# Patient Record
Sex: FEMALE | Race: WHITE | URBAN - METROPOLITAN AREA
[De-identification: names, ages, dates, MRNs, and addresses within clinical notes are randomized per-mention and may not be internally consistent; named-entity substitution may affect disease eponyms.]

---

## 2017-07-03 ENCOUNTER — HOSPITAL ENCOUNTER (EMERGENCY)
Age: 27
Discharge: HOME | End: 2017-07-03
Attending: OBSTETRICS & GYNECOLOGY | Admitting: OBSTETRICS & GYNECOLOGY
Payer: COMMERCIAL

## 2017-07-03 VITALS
TEMPERATURE: 98.3 F | RESPIRATION RATE: 22 BRPM | DIASTOLIC BLOOD PRESSURE: 84 MMHG | SYSTOLIC BLOOD PRESSURE: 122 MMHG | HEART RATE: 80 BPM

## 2017-07-03 PROBLEM — R11.0 NAUSEA: Status: ACTIVE | Noted: 2017-07-03

## 2017-07-03 LAB
ALBUMIN SERPL BCP-MCNC: 2.7 G/DL (ref 3.5–5)
ALBUMIN/GLOB SERPL: 0.6 {RATIO} (ref 1.2–3.5)
ALP SERPL-CCNC: 116 U/L (ref 50–136)
ALT SERPL-CCNC: 22 U/L (ref 12–65)
ANION GAP BLD CALC-SCNC: 12 MMOL/L (ref 7–16)
AST SERPL W P-5'-P-CCNC: 20 U/L (ref 15–37)
BASOPHILS # BLD AUTO: 0 K/UL (ref 0–0.2)
BASOPHILS # BLD: 0 % (ref 0–2)
BILIRUB SERPL-MCNC: 0.2 MG/DL (ref 0.2–1.1)
BUN SERPL-MCNC: 8 MG/DL (ref 6–23)
CALCIUM SERPL-MCNC: 9.2 MG/DL (ref 8.3–10.4)
CHLORIDE SERPL-SCNC: 103 MMOL/L (ref 98–107)
CO2 SERPL-SCNC: 23 MMOL/L (ref 21–32)
CREAT SERPL-MCNC: 0.77 MG/DL (ref 0.6–1)
DIFFERENTIAL METHOD BLD: ABNORMAL
EOSINOPHIL # BLD: 0.2 K/UL (ref 0–0.8)
EOSINOPHIL NFR BLD: 2 % (ref 0.5–7.8)
ERYTHROCYTE [DISTWIDTH] IN BLOOD BY AUTOMATED COUNT: 13.1 % (ref 11.9–14.6)
GLOBULIN SER CALC-MCNC: 4.4 G/DL (ref 2.3–3.5)
GLUCOSE SERPL-MCNC: 92 MG/DL (ref 65–100)
HCT VFR BLD AUTO: 39.8 % (ref 35.8–46.3)
HGB BLD-MCNC: 13.2 G/DL (ref 11.7–15.4)
IMM GRANULOCYTES # BLD: 0.1 K/UL (ref 0–0.5)
IMM GRANULOCYTES NFR BLD AUTO: 0.6 % (ref 0–5)
LYMPHOCYTES # BLD AUTO: 14 % (ref 13–44)
LYMPHOCYTES # BLD: 1.6 K/UL (ref 0.5–4.6)
MCH RBC QN AUTO: 32.4 PG (ref 26.1–32.9)
MCHC RBC AUTO-ENTMCNC: 33.2 G/DL (ref 31.4–35)
MCV RBC AUTO: 97.8 FL (ref 79.6–97.8)
MONOCYTES # BLD: 0.6 K/UL (ref 0.1–1.3)
MONOCYTES NFR BLD AUTO: 5 % (ref 4–12)
NEUTS SEG # BLD: 8.6 K/UL (ref 1.7–8.2)
NEUTS SEG NFR BLD AUTO: 78 % (ref 43–78)
PLATELET # BLD AUTO: 185 K/UL (ref 150–450)
PMV BLD AUTO: 11.4 FL (ref 10.8–14.1)
POTASSIUM SERPL-SCNC: 3.9 MMOL/L (ref 3.5–5.1)
PROT SERPL-MCNC: 7.1 G/DL (ref 6.3–8.2)
RBC # BLD AUTO: 4.07 M/UL (ref 4.05–5.25)
SODIUM SERPL-SCNC: 138 MMOL/L (ref 136–145)
WBC # BLD AUTO: 11.1 K/UL (ref 4.3–11.1)

## 2017-07-03 PROCEDURE — 36415 COLL VENOUS BLD VENIPUNCTURE: CPT | Performed by: OBSTETRICS & GYNECOLOGY

## 2017-07-03 PROCEDURE — 96372 THER/PROPH/DIAG INJ SC/IM: CPT

## 2017-07-03 PROCEDURE — 96360 HYDRATION IV INFUSION INIT: CPT

## 2017-07-03 PROCEDURE — 59025 FETAL NON-STRESS TEST: CPT

## 2017-07-03 PROCEDURE — 80053 COMPREHEN METABOLIC PANEL: CPT | Performed by: OBSTETRICS & GYNECOLOGY

## 2017-07-03 PROCEDURE — 74011250637 HC RX REV CODE- 250/637: Performed by: OBSTETRICS & GYNECOLOGY

## 2017-07-03 PROCEDURE — 99285 EMERGENCY DEPT VISIT HI MDM: CPT

## 2017-07-03 PROCEDURE — 85025 COMPLETE CBC W/AUTO DIFF WBC: CPT | Performed by: OBSTETRICS & GYNECOLOGY

## 2017-07-03 PROCEDURE — 74011250636 HC RX REV CODE- 250/636: Performed by: OBSTETRICS & GYNECOLOGY

## 2017-07-03 RX ORDER — ONDANSETRON 4 MG/1
4 TABLET, ORALLY DISINTEGRATING ORAL ONCE
Status: COMPLETED | OUTPATIENT
Start: 2017-07-03 | End: 2017-07-03

## 2017-07-03 RX ORDER — TERBUTALINE SULFATE 1 MG/ML
0.25 INJECTION SUBCUTANEOUS
Status: COMPLETED | OUTPATIENT
Start: 2017-07-03 | End: 2017-07-03

## 2017-07-03 RX ORDER — SODIUM CHLORIDE, SODIUM LACTATE, POTASSIUM CHLORIDE, CALCIUM CHLORIDE 600; 310; 30; 20 MG/100ML; MG/100ML; MG/100ML; MG/100ML
1000 INJECTION, SOLUTION INTRAVENOUS
Status: COMPLETED | OUTPATIENT
Start: 2017-07-03 | End: 2017-07-03

## 2017-07-03 RX ORDER — CALCIUM CARBONATE 200(500)MG
1 TABLET,CHEWABLE ORAL AS NEEDED
COMMUNITY
End: 2017-09-13

## 2017-07-03 RX ADMIN — TERBUTALINE SULFATE 0.25 MG: 1 INJECTION SUBCUTANEOUS at 14:04

## 2017-07-03 RX ADMIN — SODIUM CHLORIDE, SODIUM LACTATE, POTASSIUM CHLORIDE, AND CALCIUM CHLORIDE 1000 ML: 600; 310; 30; 20 INJECTION, SOLUTION INTRAVENOUS at 13:25

## 2017-07-03 RX ADMIN — ONDANSETRON 4 MG: 4 TABLET, ORALLY DISINTEGRATING ORAL at 12:48

## 2017-07-03 NOTE — PROGRESS NOTES
07/03/17 1332   Pain 1   Pain Scale 1 Numeric (0 - 10)   Pain Intensity 1 7   Patient Stated Pain Goal 5   Pain Reassessment 1 Yes   Pain Onset 1 x 30 min   Pain Location 1 Abdomen;Back   Pain Orientation 1 Lower;Mid   Pain Description 1 Aching;Cramping; Intermittent   Pain Intervention(s) 1 Medication (see MAR)   Pain increasing.

## 2017-07-03 NOTE — PROGRESS NOTES
Lab called to draw blood for CBC and CMP. States they are on their way.   Pt nauseated with [address] [facility].

## 2017-07-03 NOTE — ED PROVIDER NOTES
Chief Complaint:  Nausea, generally not feeling well, pelvic craming    32 y.o. female G1 at 35w3d  weeks gestation who is seen for nausea and generally not feeling well. Seen earlier today in MD's office with nausea and one episode of vomiting. bp noted to be slightly elevated. To DAVID for evaluation. no headache or swelling. Good fetal movement. HISTORY:    History   Sexual Activity    Sexual activity: Yes    Partners: Male    Birth control/ protection: None     Patient's last menstrual period was 10/04/2016. Social History     Social History    Marital status: SINGLE     Spouse name: N/A    Number of children: N/A    Years of education: N/A     Occupational History    Not on file. Social History Main Topics    Smoking status: Former Smoker    Smokeless tobacco: Never Used      Comment: Pt trying to quit    Alcohol use No    Drug use: No    Sexual activity: Yes     Partners: Male     Birth control/ protection: None     Other Topics Concern    Not on file     Social History Narrative       Past Surgical History:   Procedure Laterality Date    HX DILATION AND CURETTAGE  2011    Growth removed from uterus    HX WISDOM TEETH EXTRACTION         Past Medical History:   Diagnosis Date    Abnormal Papanicolaou smear of cervix     years ago/WNL since    HPV in female          ROS:  A 12 point review of symptoms negative except for chief complaint as described above. PHYSICAL EXAM:  Blood pressure 125/79, pulse 74, temperature 98.3 °F (36.8 °C), resp. rate 20, last menstrual period 10/04/2016. Constitutional: The patient appears well, alert, oriented x 3. Cardiovascular: Heart RRR, no murmurs.    Respiratory: Lungs clear, no respiratory distress  GI: Abdomen soft, nontender, no guarding  No fundal tenderness  Musculoskeletal: no cva tenderness  Upper ext: no edema, reflexes +2  Lower ext: no edema, neg paola's, reflexes +2  Skin: no rashes or lesions; good turger  Psychiatric:Mood/ Affect: appropriate  Genitourinary: SVE:ft/th/high  FHT:reactive  TOCO:contractions q2-3- resolved with iv hydration and terb  Recent Results (from the past 12 hour(s))   CBC WITH AUTOMATED DIFF    Collection Time: 17  1:10 PM   Result Value Ref Range    WBC 11.1 4.3 - 11.1 K/uL    RBC 4.07 4.05 - 5.25 M/uL    HGB 13.2 11.7 - 15.4 g/dL    HCT 39.8 35.8 - 46.3 %    MCV 97.8 79.6 - 97.8 FL    MCH 32.4 26.1 - 32.9 PG    MCHC 33.2 31.4 - 35.0 g/dL    RDW 13.1 11.9 - 14.6 %    PLATELET 888 614 - 381 K/uL    MPV 11.4 10.8 - 14.1 FL    DF AUTOMATED      NEUTROPHILS 78 43 - 78 %    LYMPHOCYTES 14 13 - 44 %    MONOCYTES 5 4.0 - 12.0 %    EOSINOPHILS 2 0.5 - 7.8 %    BASOPHILS 0 0.0 - 2.0 %    IMMATURE GRANULOCYTES 0.6 0.0 - 5.0 %    ABS. NEUTROPHILS 8.6 (H) 1.7 - 8.2 K/UL    ABS. LYMPHOCYTES 1.6 0.5 - 4.6 K/UL    ABS. MONOCYTES 0.6 0.1 - 1.3 K/UL    ABS. EOSINOPHILS 0.2 0.0 - 0.8 K/UL    ABS. BASOPHILS 0.0 0.0 - 0.2 K/UL    ABS. IMM. GRANS. 0.1 0.0 - 0.5 K/UL   METABOLIC PANEL, COMPREHENSIVE    Collection Time: 17  1:10 PM   Result Value Ref Range    Sodium 138 136 - 145 mmol/L    Potassium 3.9 3.5 - 5.1 mmol/L    Chloride 103 98 - 107 mmol/L    CO2 23 21 - 32 mmol/L    Anion gap 12 7 - 16 mmol/L    Glucose 92 65 - 100 mg/dL    BUN 8 6 - 23 MG/DL    Creatinine 0.77 0.6 - 1.0 MG/DL    GFR est AA >60 >60 ml/min/1.73m2    GFR est non-AA >60 >60 ml/min/1.73m2    Calcium 9.2 8.3 - 10.4 MG/DL    Bilirubin, total 0.2 0.2 - 1.1 MG/DL    ALT (SGPT) 22 12 - 65 U/L    AST (SGOT) 20 15 - 37 U/L    Alk.  phosphatase 116 50 - 136 U/L    Protein, total 7.1 6.3 - 8.2 g/dL    Albumin 2.7 (L) 3.5 - 5.0 g/dL    Globulin 4.4 (H) 2.3 - 3.5 g/dL    A-G Ratio 0.6 (L) 1.2 - 3.5       I personally reviewed pt's medical record including relevant labs     Assessment/Plan:  31 yo  at 35w3d with nausea and contractions without cervical change  Contractions resolved with one dose terb and iv fluids  Labs wnl  bp improved with rest  Nausea resolved with zofran- tolerating po  Reactive nst

## 2017-07-03 NOTE — PROGRESS NOTES
07/03/17 1252   Uterine Activity   Mode External   Frequency (min) x2 with irritability   Duration (sec)    Intensity Mild   Uterine Resting Tone Relaxed   States she feels like she is having period cramps and the nausea intensifies when she feels the cramp- some are worse than others. Zofran po given at 1245.

## 2017-07-03 NOTE — PROGRESS NOTES
UC's palpated. Some are and some are moderate. Pt now breathing with UC and was not on admission to triage.

## 2017-07-03 NOTE — PROGRESS NOTES
07/03/17 1215   Pain 1   Pain Scale 1 Numeric (0 - 10)   Pain Intensity 1 5   Patient Stated Pain Goal 5   Pain Onset 1 last PM   Pain Location 1 Abdomen   Pain Orientation 1 Lower;Mid   Pain Description 1 Cramping   Pain Intervention(s) 1 Declines

## 2017-07-03 NOTE — PROGRESS NOTES
Admit to room 464 for triage. Dr Tatyana Jang called about 1 hour ago. Stated that she is sending her over for triage per Dr Gloria Rudolph. Was seen in the office today with N/V, doesn't feel good, BP elevated at 142/98, having UC on monitor. SVE per Dr Tatyana Jang was FT/30/-1 to -2. EFM applied. States she last ate at 2200 last PM and vomited after.

## 2017-07-03 NOTE — PROGRESS NOTES
Post toco adjustment, UC now visible. Dr Renny Suarez viewed strip from nurses station. Order received for IVFB of LR 1 liter.

## 2017-07-03 NOTE — PROGRESS NOTES
07/03/17 1445   Cervical Exam   Dilation (cm) 0  (FT)   Eff (thick)   Station Ballotable   Vaginal exam done by? Dr Kim Titus pain is much better. Tolerated exam well. Discharge orders received. Will observe x 10 more min and D/C home. Pt instructed per Dr Ortiz Else that if UC return at more than 2/3 in one hour and are painful like they were here to come back to hospital.  States understanding.

## 2017-07-03 NOTE — PROGRESS NOTES
07/03/17 1325   Peripheral IV 07/03/17 Left Forearm   Placement Date/Time: 07/03/17 1325   Number of Attempts: 1  Inserted By: MALINA RN  Size: 18 G  Orientation: Left  Location: Forearm   Site Assessment Clean, dry, & intact   Phlebitis Assessment 0   Infiltration Assessment 0   Dressing Status Clean, dry, & intact   Dressing Type Tape;Transparent   Hub Color/Line Status Green; Infusing   Tolerated procedure well. Lab has already drawn her blood. LR IVFB in progress.   States these UC feel worse than when they started last PM.

## 2017-07-03 NOTE — DISCHARGE INSTRUCTIONS
Keep next scheduled appointment    Routine  labor precautions     Labor: Care Instructions  Your Care Instructions   labor is the start of labor between 21 and 36 weeks of pregnancy. A full-term pregnancy lasts 37 to 42 weeks. In labor, the uterus contracts to open the cervix. This is the first stage of childbirth.  labor can be caused by a problem with the baby, the mother, or both. Often the cause is not known. In some cases, doctors use medicines to try to delay labor until 29 or more weeks of pregnancy. By this time, a baby has grown enough so that problems are not likely. In some cases--such as with a serious infection--it is healthier for the baby to be born early. Your treatment will depend on how far along you are in your pregnancy and on your health and your baby's health. Follow-up care is a key part of your treatment and safety. Be sure to make and go to all appointments, and call your doctor if you are having problems. It's also a good idea to know your test results and keep a list of the medicines you take. How can you care for yourself at home? · If your doctor prescribed medicines, take them exactly as directed. Call your doctor if you think you are having a problem with your medicine. · Rest until your doctor advises you about activity. He or she will tell you if you should stay in bed most of the time. You may need to arrange for  if you have young children. · Do not have sexual intercourse unless your doctor says it is safe. · Use pads, not tampons, if you have vaginal bleeding. · Make sure to drink plenty of fluids. Dehydration can lead to contractions. If you have kidney, heart, or liver disease and have to limit fluids, talk with your doctor before you increase the amount of fluids you drink. · Do not smoke or allow others to smoke around you. If you need help quitting, talk to your doctor about stop-smoking programs and medicines.  These can increase your chances of quitting for good. When should you call for help? Call 911 anytime you think you may need emergency care. For example, call if:  · You passed out (lost consciousness). · You have severe vaginal bleeding. · You have severe pain in your belly or pelvis. · You have had fluid gushing or leaking from your vagina and you know or think the umbilical cord is bulging into your vagina. If this happens, immediately get down on your knees so your rear end (buttocks) is higher than your head. This will decrease the pressure on the cord until help arrives. Call your doctor now or seek immediate medical care if:  · You have signs of preeclampsia, such as:  ¨ Sudden swelling of your face, hands, or feet. ¨ New vision problems (such as dimness or blurring). ¨ A severe headache. · You have any vaginal bleeding. · You have belly pain or cramping. · You have a fever. · You have had regular contractions (with or without pain) for an hour. This means that you have 6 or more within 1 hour after you change your position and drink fluids. · You have a sudden release of fluid from the vagina. · You have low back pain or pelvic pressure that does not go away.   · You notice that your baby has stopped moving or is moving much less than normal.  Watch closely for changes in your health, and be sure to contact your doctor if you have any problems

## 2017-07-03 NOTE — IP AVS SNAPSHOT
[facility] 
 
 
 [address] 
[phone] Patient: Omari nAgel MRN: [id number] BMA:[id number] You are allergic to the following Allergen Reactions Sulfa (Sulfonamide Antibiotics) Nausea and Vomiting Recent Documentation OB Status Smoking Status Pregnant Former Smoker Emergency Contacts Name Discharge Info Relation Home Work Mobile Yaron Bertrand  Sister [23] [phone] About your hospitalization You were admitted on:  N/A You last received care in the:  [facility] ANTEPARTUM You were discharged on:  July 3, 2017 Unit phone number:  [phone] Why you were hospitalized Your primary diagnosis was:  Not on File Your diagnoses also included:  Nausea Providers Seen During Your Hospitalizations Provider Role Specialty Primary office phone Jaimie Chance MD Attending Provider Obstetrics & Gynecology [phone] Your Primary Care Physician (PCP) Primary Care Physician Office Phone Office Fax NONE ** None ** ** None ** Follow-up Information None Your Appointments Wednesday July 12, 2017  9:30 AM EDT Return OB with [address] ([facility]) [address] [address] [phone] Current Discharge Medication List  
  
ASK your doctor about these medications Dose & Instructions Dispensing Information Comments Morning Noon Evening Bedtime  
 calcium carbonate 200 mg calcium (500 mg) Chew Commonly known as:  TUMS Your last dose was: Your next dose is:    
   
   
 Dose:  1 Tab Take 1 Tab by mouth as needed. Indications: HEARTBURN Refills:  0 CLARITIN 10 mg tablet Generic drug:  loratadine Your last dose was:     
   
Your next dose is:    
   
   
 Dose:  10 mg  
 Take 10 mg by mouth. Refills:  0 PNV#16-Iron Fum & PS-FA-OM-3 35-1-200 mg Cap Your last dose was: Your next dose is: Take  by mouth. Refills:  0  
     
   
   
   
  
 raNITIdine 150 mg tablet Commonly known as:  ZANTAC Your last dose was: Your next dose is:    
   
   
 Dose:  150 mg Take 1 Tab by mouth two (2) times a day. Quantity:  60 Tab Refills:  12 Discharge Instructions Keep next scheduled appointment Routine  labor precautions  Labor: Care Instructions Your Care Instructions  labor is the start of labor between 20 and 36 weeks of pregnancy. A full-term pregnancy lasts 37 to 42 weeks. In labor, the uterus contracts to open the cervix. This is the first stage of childbirth.  labor can be caused by a problem with the baby, the mother, or both. Often the cause is not known. In some cases, doctors use medicines to try to delay labor until 29 or more weeks of pregnancy. By this time, a baby has grown enough so that problems are not likely. In some casessuch as with a serious infectionit is healthier for the baby to be born early. Your treatment will depend on how far along you are in your pregnancy and on your health and your baby's health. Follow-up care is a key part of your treatment and safety. Be sure to make and go to all appointments, and call your doctor if you are having problems. It's also a good idea to know your test results and keep a list of the medicines you take. How can you care for yourself at home? · If your doctor prescribed medicines, take them exactly as directed. Call your doctor if you think you are having a problem with your medicine. · Rest until your doctor advises you about activity. He or she will tell you if you should stay in bed most of the time. You may need to arrange for  if you have young children. · Do not have sexual intercourse unless your doctor says it is safe. · Use pads, not tampons, if you have vaginal bleeding. · Make sure to drink plenty of fluids. Dehydration can lead to contractions. If you have kidney, heart, or liver disease and have to limit fluids, talk with your doctor before you increase the amount of fluids you drink. · Do not smoke or allow others to smoke around you. If you need help quitting, talk to your doctor about stop-smoking programs and medicines. These can increase your chances of quitting for good. When should you call for help? Call 911 anytime you think you may need emergency care. For example, call if: 
· You passed out (lost consciousness). · You have severe vaginal bleeding. · You have severe pain in your belly or pelvis. · You have had fluid gushing or leaking from your vagina and you know or think the umbilical cord is bulging into your vagina. If this happens, immediately get down on your knees so your rear end (buttocks) is higher than your head. This will decrease the pressure on the cord until help arrives. Call your doctor now or seek immediate medical care if: 
· You have signs of preeclampsia, such as: 
¨ Sudden swelling of your face, hands, or feet. ¨ New vision problems (such as dimness or blurring). ¨ A severe headache. · You have any vaginal bleeding. · You have belly pain or cramping. · You have a fever. · You have had regular contractions (with or without pain) for an hour. This means that you have 6 or more within 1 hour after you change your position and drink fluids. · You have a sudden release of fluid from the vagina. · You have low back pain or pelvic pressure that does not go away. · You notice that your baby has stopped moving or is moving much less than normal. 
Watch closely for changes in your health, and be sure to contact your doctor if you have any problems Discharge Instructions Attachments/References  LABOR (ENGLISH) Discharge Orders None Introducing [facility]! Dear Karmen Pendleotn: Thank you for requesting a [employer] account. Our records indicate that you already have an active [employer] account. You can access your account anytime at [email or web address] Did you know that you can access your hospital and ER discharge instructions at any time in [employer]? You can also review all of your test results from your hospital stay or ER visit. Additional Information If you have questions, please visit the Frequently Asked Questions section of the [employer] website at [email or web address] Remember, [employer] is NOT to be used for urgent needs. For medical emergencies, dial 911. Now available from your iPhone and Android! General Information Please provide this summary of care documentation to your next provider. Patient Signature:  ____________________________________________________________ Date:  ____________________________________________________________  
  
Miguel Ángel Fraser Provider Signature:  ____________________________________________________________ Date:  ____________________________________________________________ More Information  Labor: Care Instructions Your Care Instructions  labor is the start of labor between 20 and 36 weeks of pregnancy. A full-term pregnancy lasts 37 to 42 weeks. In labor, the uterus contracts to open the cervix. This is the first stage of childbirth.  labor can be caused by a problem with the baby, the mother, or both. Often the cause is not known. In some cases, doctors use medicines to try to delay labor until 29 or more weeks of pregnancy. By this time, a baby has grown enough so that problems are not likely. In some casessuch as with a serious infectionit is healthier for the baby to be born early.  Your treatment will depend on how far along you are in your pregnancy and on your health and your baby's health. Follow-up care is a key part of your treatment and safety. Be sure to make and go to all appointments, and call your doctor if you are having problems. It's also a good idea to know your test results and keep a list of the medicines you take. How can you care for yourself at home? · If your doctor prescribed medicines, take them exactly as directed. Call your doctor if you think you are having a problem with your medicine. · Rest until your doctor advises you about activity. He or she will tell you if you should stay in bed most of the time. You may need to arrange for  if you have young children. · Do not have sexual intercourse unless your doctor says it is safe. · Use pads, not tampons, if you have vaginal bleeding. · Make sure to drink plenty of fluids. Dehydration can lead to contractions. If you have kidney, heart, or liver disease and have to limit fluids, talk with your doctor before you increase the amount of fluids you drink. · Do not smoke or allow others to smoke around you. If you need help quitting, talk to your doctor about stop-smoking programs and medicines. These can increase your chances of quitting for good. When should you call for help? Call 911 anytime you think you may need emergency care. For example, call if: 
· You passed out (lost consciousness). · You have severe vaginal bleeding. · You have severe pain in your belly or pelvis. · You have had fluid gushing or leaking from your vagina and you know or think the umbilical cord is bulging into your vagina. If this happens, immediately get down on your knees so your rear end (buttocks) is higher than your head. This will decrease the pressure on the cord until help arrives. Call your doctor now or seek immediate medical care if: 
· You have signs of preeclampsia, such as: 
¨ Sudden swelling of your face, hands, or feet. ¨ New vision problems (such as dimness or blurring). ¨ A severe headache. · You have any vaginal bleeding. · You have belly pain or cramping. · You have a fever. · You have had regular contractions (with or without pain) for an hour. This means that you have 6 or more within 1 hour after you change your position and drink fluids. · You have a sudden release of fluid from the vagina. · You have low back pain or pelvic pressure that does not go away. · You notice that your baby has stopped moving or is moving much less than normal. 
Watch closely for changes in your health, and be sure to contact your doctor if you have any problems. Where can you learn more? Go to http://danielle-lucia.info/. Enter Q400 in the search box to learn more about \" Labor: Care Instructions. \" Current as of: 2017 Content Version: 11.3 © 1258-7596 [employer]. Care instructions adapted under license by [employer] (which disclaims liability or warranty for this information). If you have questions about a medical condition or this instruction, always ask your healthcare professional. [place] [address] any warranty or liability for your use of this information.

## 2017-07-03 NOTE — IP AVS SNAPSHOT
Current Discharge Medication List  
  
ASK your doctor about these medications Dose & Instructions Dispensing Information Comments Morning Noon Evening Bedtime  
 calcium carbonate 200 mg calcium (500 mg) Chew Commonly known as:  TUMS Your last dose was: Your next dose is:    
   
   
 Dose:  1 Tab Take 1 Tab by mouth as needed. Indications: HEARTBURN Refills:  0 CLARITIN 10 mg tablet Generic drug:  loratadine Your last dose was: Your next dose is:    
   
   
 Dose:  10 mg Take 10 mg by mouth. Refills:  0 PNV#16-Iron Fum & PS-FA-OM-3 35-1-200 mg Cap Your last dose was: Your next dose is: Take  by mouth. Refills:  0  
     
   
   
   
  
 raNITIdine 150 mg tablet Commonly known as:  ZANTAC Your last dose was: Your next dose is:    
   
   
 Dose:  150 mg Take 1 Tab by mouth two (2) times a day. Quantity:  60 Tab Refills:  12

## 2017-07-03 NOTE — IP AVS SNAPSHOT
Summary of Care Report The Summary of Care report has been created to help improve care coordination. Users with access to [employer] or [employer] [address] (Web-based application) may access additional patient information including the Discharge Summary. If you are not currently a [address] user and need more information, please call the number listed below in the [place] [address] section and ask to be connected with Medical Records. Facility Information Name Address Phone [address] [place] [address] [address] [place] [id number] [phone] Patient Information Patient Name Sex NENITA Stover ([id number]) Female 1990 Discharge Information Admitting Provider Service Area Unit Ramiro Gilmore MD / [address] [facility] 4 Antepartum / [phone] Discharge Provider Discharge Date/Time Discharge Disposition Destination (none) (none) (none) (none) Patient Language Language ENGLISH [13] Hospital Problems as of 7/3/2017  Reviewed: 2017 11:21 AM by Andrea Chavez MD  
  
  
  
 Class Noted - Resolved Last Modified POA Active Problems Nausea  7/3/2017 - Present 7/3/2017 by Ramiro Gilmore MD Unknown Entered by Ramiro Gilmore MD  
  
Non-Hospital Problems as of 7/3/2017  Reviewed: 2017 11:21 AM by Andrea Chavez MD  
  
  
  
 Class Noted - Resolved Last Modified Active Problems High risk pregnancy due to smoking  2016 - Present 2016 by Andrea Chavez MD  
  Entered by Andrea Chavez MD  
  Overview Addendum 2016  4:25 PM by Andrea Chavez MD  
   1)prenatal labs nl 
2)pt has decreased her # of cigs since +hpt You are allergic to the following Allergen Reactions Sulfa (Sulfonamide Antibiotics) Nausea and Vomiting Current Discharge Medication List  
  
ASK your doctor about these medications Dose & Instructions Dispensing Information Comments  
 calcium carbonate 200 mg calcium (500 mg) Chew Commonly known as:  TUMS Dose:  1 Tab Take 1 Tab by mouth as needed. Indications: HEARTBURN Refills:  0 CLARITIN 10 mg tablet Generic drug:  loratadine Dose:  10 mg Take 10 mg by mouth. Refills:  0 PNV#16-Iron Fum & PS-FA-OM-3 35-1-200 mg Cap Take  by mouth. Refills:  0  
   
 raNITIdine 150 mg tablet Commonly known as:  ZANTAC Dose:  150 mg Take 1 Tab by mouth two (2) times a day. Quantity:  60 Tab Refills:  12 Follow-up Information None Discharge Instructions Keep next scheduled appointment Routine  labor precautions  Labor: Care Instructions Your Care Instructions  labor is the start of labor between 20 and 36 weeks of pregnancy. A full-term pregnancy lasts 37 to 42 weeks. In labor, the uterus contracts to open the cervix. This is the first stage of childbirth.  labor can be caused by a problem with the baby, the mother, or both. Often the cause is not known. In some cases, doctors use medicines to try to delay labor until 29 or more weeks of pregnancy. By this time, a baby has grown enough so that problems are not likely. In some casessuch as with a serious infectionit is healthier for the baby to be born early. Your treatment will depend on how far along you are in your pregnancy and on your health and your baby's health. Follow-up care is a key part of your treatment and safety. Be sure to make and go to all appointments, and call your doctor if you are having problems. It's also a good idea to know your test results and keep a list of the medicines you take. How can you care for yourself at home? · If your doctor prescribed medicines, take them exactly as directed.  Call your doctor if you think you are having a problem with your medicine. · Rest until your doctor advises you about activity. He or she will tell you if you should stay in bed most of the time. You may need to arrange for  if you have young children. · Do not have sexual intercourse unless your doctor says it is safe. · Use pads, not tampons, if you have vaginal bleeding. · Make sure to drink plenty of fluids. Dehydration can lead to contractions. If you have kidney, heart, or liver disease and have to limit fluids, talk with your doctor before you increase the amount of fluids you drink. · Do not smoke or allow others to smoke around you. If you need help quitting, talk to your doctor about stop-smoking programs and medicines. These can increase your chances of quitting for good. When should you call for help? Call 911 anytime you think you may need emergency care. For example, call if: 
· You passed out (lost consciousness). · You have severe vaginal bleeding. · You have severe pain in your belly or pelvis. · You have had fluid gushing or leaking from your vagina and you know or think the umbilical cord is bulging into your vagina. If this happens, immediately get down on your knees so your rear end (buttocks) is higher than your head. This will decrease the pressure on the cord until help arrives. Call your doctor now or seek immediate medical care if: 
· You have signs of preeclampsia, such as: 
¨ Sudden swelling of your face, hands, or feet. ¨ New vision problems (such as dimness or blurring). ¨ A severe headache. · You have any vaginal bleeding. · You have belly pain or cramping. · You have a fever. · You have had regular contractions (with or without pain) for an hour. This means that you have 6 or more within 1 hour after you change your position and drink fluids. · You have a sudden release of fluid from the vagina. · You have low back pain or pelvic pressure that does not go away. · You notice that your baby has stopped moving or is moving much less than normal. 
Watch closely for changes in your health, and be sure to contact your doctor if you have any problems Chart Review Routing History No Routing History on File

## 2017-07-04 NOTE — PROGRESS NOTES
Discharge instructions given for  labor. States understanding. Discharge to personal auto via ambulation with  at side.

## 2017-07-28 ENCOUNTER — HOSPITAL ENCOUNTER (EMERGENCY)
Age: 27
Discharge: HOME | End: 2017-07-28
Attending: OBSTETRICS & GYNECOLOGY | Admitting: OBSTETRICS & GYNECOLOGY
Payer: COMMERCIAL

## 2017-07-28 VITALS
DIASTOLIC BLOOD PRESSURE: 82 MMHG | HEART RATE: 101 BPM | HEIGHT: 65 IN | SYSTOLIC BLOOD PRESSURE: 142 MMHG | BODY MASS INDEX: 29.99 KG/M2 | RESPIRATION RATE: 18 BRPM | TEMPERATURE: 98.2 F | WEIGHT: 180 LBS

## 2017-07-28 PROBLEM — Z37.9 NORMAL LABOR: Status: ACTIVE | Noted: 2017-07-28

## 2017-07-28 PROCEDURE — 59025 FETAL NON-STRESS TEST: CPT

## 2017-07-28 PROCEDURE — 99285 EMERGENCY DEPT VISIT HI MDM: CPT

## 2017-07-28 PROCEDURE — 74011250637 HC RX REV CODE- 250/637: Performed by: OBSTETRICS & GYNECOLOGY

## 2017-07-28 RX ORDER — OXYCODONE AND ACETAMINOPHEN 5; 325 MG/1; MG/1
1 TABLET ORAL ONCE
Status: COMPLETED | OUTPATIENT
Start: 2017-07-29 | End: 2017-07-28

## 2017-07-28 RX ADMIN — OXYCODONE HYDROCHLORIDE AND ACETAMINOPHEN 1 TABLET: 5; 325 TABLET ORAL at 23:45

## 2017-07-28 NOTE — IP AVS SNAPSHOT
Summary of Care Report The Summary of Care report has been created to help improve care coordination. Users with access to [employer] or [address] (Web-based application) may access additional patient information including the Discharge Summary. If you are not currently a [address] user and need more information, please call the number listed below in the [place] [address] section and ask to be connected with Medical Records. Facility Information Name Address Phone [address] [place] [address] [address] [place] [phone] Patient Information Patient Name Sex NENITA Morris ([id number]) Female 1990 Discharge Information Admitting Provider Service Area Unit Sanjay Locke MD / [address] [facility] 4 Antepartum / [phone] Discharge Provider Discharge Date/Time Discharge Disposition Destination (none) (none) (none) (none) Patient Language Language ENGLISH [13] Hospital Problems as of 2017  Reviewed: 2017 11:00 PM by Sanjay Locke MD  
  
  
  
 Class Noted - Resolved Last Modified POA Active Problems Normal labor  2017 - Present 2017 by Sanjay Locke MD Unknown Entered by Sanjay Locke MD  
  
Non-Hospital Problems as of 2017  Reviewed: 2017 11:00 PM by Sanjay Locke MD  
  
  
  
 Class Noted - Resolved Last Modified Active Problems High risk pregnancy due to smoking  2016 - Present 2016 by Abigail Crews MD  
  Entered by Abigail Crews MD  
  Overview Addendum 2016  4:25 PM by Abigail Crews MD  
   1)prenatal labs nl 
2)pt has decreased her # of cigs since +hpt Nausea  7/3/2017 - Present 7/3/2017 by Kalina Aguirre MD  
  Entered by Kalina Aguirre MD  
  
You are allergic to the following Allergen Reactions Sulfa (Sulfonamide Antibiotics) Nausea and Vomiting Current Discharge Medication List  
  
ASK your doctor about these medications Dose & Instructions Dispensing Information Comments  
 calcium carbonate 200 mg calcium (500 mg) Chew Commonly known as:  TUMS Dose:  1 Tab Take 1 Tab by mouth as needed. Indications: HEARTBURN Refills:  0 CLARITIN 10 mg tablet Generic drug:  loratadine Dose:  10 mg Take 10 mg by mouth. Refills:  0 PNV#16-Iron Fum & PS-FA-OM-3 35-1-200 mg Cap Take  by mouth. Refills:  0  
   
 raNITIdine 150 mg tablet Commonly known as:  ZANTAC Dose:  150 mg Take 1 Tab by mouth two (2) times a day. Quantity:  60 Tab Refills:  12 Follow-up Information None Discharge Instructions If you experience any of the following symptoms bright red vaginal bleeding, gush of fluid, decreased fetal movement, uterine contractions ten minutes or less, or any other concerns, please follow up with [address] [place] [address] Delivery or physicians office. Pregnancy Precautions: Care Instructions Your Care Instructions There is no sure way to prevent labor before your due date ( labor) or to prevent most other pregnancy problems. But there are things you can do to increase your chances of a healthy pregnancy. Go to your appointments, follow your doctor's advice, and take good care of yourself. Eat well, and exercise (if your doctor agrees). And make sure to drink plenty of water. Follow-up care is a key part of your treatment and safety. Be sure to make and go to all appointments, and call your doctor if you are having problems. It's also a good idea to know your test results and keep a list of the medicines you take. How can you care for yourself at home? · Make sure you go to your prenatal appointments.  At each visit, your doctor will check your blood pressure. Your doctor will also check to see if you have protein in your urine. High blood pressure and protein in urine are signs of preeclampsia. This condition can be dangerous for you and your baby. · Drink plenty of fluids, enough so that your urine is light yellow or clear like water. Dehydration can cause contractions. If you have kidney, heart, or liver disease and have to limit fluids, talk with your doctor before you increase the amount of fluids you drink. · Tell your doctor right away if you notice any symptoms of an infection, such as: ¨ Burning when you urinate. ¨ A foul-smelling discharge from your vagina. ¨ Vaginal itching. ¨ Unexplained fever. ¨ Unusual pain or soreness in your uterus or lower belly. · Eat a balanced diet. Include plenty of foods that are high in calcium and iron. ¨ Foods high in calcium include milk, cheese, yogurt, almonds, and broccoli. ¨ Foods high in iron include red meat, shellfish, poultry, eggs, beans, raisins, whole-grain bread, and leafy green vegetables. · Do not smoke. If you need help quitting, talk to your doctor about stop-smoking programs and medicines. These can increase your chances of quitting for good. · Do not drink alcohol or use illegal drugs. · Follow your doctor's directions about activity. Your doctor will let you know how much, if any, exercise you can do. · Ask your doctor if you can have sex. If you are at risk for early labor, your doctor may ask you to not have sex. · Take care to prevent falls. During pregnancy, your joints are loose, and your balance is off. Sports such as bicycling, skiing, or in-line skating can increase your risk of falling. And don't ride horses or motorcycles, dive, water ski, scuba dive, or parachute jump while you are pregnant. · Avoid getting very hot. Do not use saunas or hot tubs. Avoid staying out in the sun in hot weather for long periods.  Take acetaminophen (Tylenol) to lower a high fever. · Do not take any over-the-counter or herbal medicines or supplements without talking to your doctor or pharmacist first. 
When should you call for help? Call 911 anytime you think you may need emergency care. For example, call if: 
· You passed out (lost consciousness). · You have severe vaginal bleeding. · You have severe pain in your belly or pelvis. · You have had fluid gushing or leaking from your vagina and you know or think the umbilical cord is bulging into your vagina. If this happens, immediately get down on your knees so your rear end (buttocks) is higher than your head. This will decrease the pressure on the cord until help arrives. Call your doctor now or seek immediate medical care if: 
· You have signs of preeclampsia, such as: 
¨ Sudden swelling of your face, hands, or feet. ¨ New vision problems (such as dimness or blurring). ¨ A severe headache. · You have any vaginal bleeding. · You have belly pain or cramping. · You have a fever. · You have had regular contractions (with or without pain) for an hour. This means that you have 8 or more within 1 hour or 4 or more in 20 minutes after you change your position and drink fluids. · You have a sudden release of fluid from your vagina. · You have low back pain or pelvic pressure that does not go away. · You notice that your baby has stopped moving or is moving much less than normal. 
Watch closely for changes in your health, and be sure to contact your doctor if you have any problems. Where can you learn more? Go to http://danielle-lucia.info/. Enter [id number] in the search box to learn more about \"Pregnancy Precautions: Care Instructions. \" Current as of: March 16, 2017 Content Version: 11.3 © 7444-3734 [employer].  Care instructions adapted under license by [employer] (which disclaims liability or warranty for this information). If you have questions about a medical condition or this instruction, always ask your healthcare professional. [place] [address] any warranty or liability for your use of this information. Week 39 of Your Pregnancy: Care Instructions Your Care Instructions During these final weeks, you may feel anxious to see your new baby. [place] babies often look different from what you see in pictures or movies. Right after birth, their heads may have a strange shape. Their eyes may be puffy. And their genitals may be swollen. They may also have very dry skin, or red marks on the eyelids, nose, or neck. Still, most parents think their babies are beautiful. Follow-up care is a key part of your treatment and safety. Be sure to make and go to all appointments, and call your doctor if you are having problems. It's also a good idea to know your test results and keep a list of the medicines you take. How can you care for yourself at home? Prepare to breastfeed · If you are breastfeeding, continue to eat healthy foods. · Avoid alcohol, cigarettes, and drugs. This includes prescription and over-the-counter medicines. · You can help prevent sore nipples if you feed your baby in the correct position. Nurses will help you learn to do this. · Your  will need to be fed about every 1½ to 3 hours. Choose the right birth control after your baby is born · Women who are breastfeeding can still get pregnant. Use birth control if you don't want to get pregnant. · Intrauterine devices (IUDs) work for women who want to wait at least 2 years before getting pregnant again. They are safe to use while you are breastfeeding. · Depo-Provera can be used while you are breastfeeding. It is a shot you get every 3 months. · Birth control pills work well. But you need a different kind of pill while you are breastfeeding.  And when you start taking these pills, you need to make sure to use another type of birth control until you start your second pack. · Diaphragms, cervical caps, tubal implants, and condoms with spermicide work less well after birth. If you have a diaphragm or cervical cap, you will need to have it refitted. · Tubal ligation (tying your tubes) and vasectomy are both permanent. These are good options if you are sure you are done having children. Where can you learn more? Go to http://dainelle-lucia.info/. Enter E274 in the search box to learn more about \"Week 39 of Your Pregnancy: Care Instructions. \" Current as of: March 16, 2017 Content Version: 11.3 © 0596-2108 [employer]. Care instructions adapted under license by [employer] (which disclaims liability or warranty for this information). If you have questions about a medical condition or this instruction, always ask your healthcare professional. [address] any warranty or liability for your use of this information. Chart Review Routing History No Routing History on File

## 2017-07-28 NOTE — IP AVS SNAPSHOT
[facility] [address] 
[phone] Patient: Christa Ling MRN: [id number] CXE:[id number] Current Discharge Medication List  
  
ASK your doctor about these medications Dose & Instructions Dispensing Information Comments Morning Noon Evening Bedtime  
 calcium carbonate 200 mg calcium (500 mg) Chew Commonly known as:  TUMS Your last dose was: Your next dose is:    
   
   
 Dose:  1 Tab Take 1 Tab by mouth as needed. Indications: HEARTBURN Refills:  0 CLARITIN 10 mg tablet Generic drug:  loratadine Your last dose was: Your next dose is:    
   
   
 Dose:  10 mg Take 10 mg by mouth. Refills:  0 PNV#16-Iron Fum & PS-FA-OM-3 35-1-200 mg Cap Your last dose was: Your next dose is: Take  by mouth. Refills:  0  
     
   
   
   
  
 raNITIdine 150 mg tablet Commonly known as:  ZANTAC Your last dose was: Your next dose is:    
   
   
 Dose:  150 mg Take 1 Tab by mouth two (2) times a day. Quantity:  60 Tab Refills:  12

## 2017-07-28 NOTE — IP AVS SNAPSHOT
[address] 
[phone] Patient: Kristopher Browne MRN: [id number] [facility]:4/94/6566 You are allergic to the following Allergen Reactions Sulfa (Sulfonamide Antibiotics) Nausea and Vomiting Recent Documentation Height Weight BMI OB Status Smoking Status 1.651 m 81.6 kg 29.95 kg/m2 Pregnant Former Smoker Emergency Contacts Name Discharge Info Relation Home Work Mobile Jacky Perez  Boyfriend [17] [phone] Mely Aldrich  Sister [23] [phone] About your hospitalization You were admitted on:  N/A You last received care in the:  [facility] 4 ANTEPARTUM You were discharged on:  July 28, 2017 Unit phone number:  [phone] Why you were hospitalized Your primary diagnosis was:  Not on File Your diagnoses also included:  Normal Labor Providers Seen During Your Hospitalizations Provider Role Specialty Primary office phone Felecia Rodriguez MD Attending Provider Obstetrics & Gynecology [phone] Your Primary Care Physician (PCP) Primary Care Physician Office Phone Office Fax NONE ** None ** ** None ** Follow-up Information None Your Appointments Friday August 04, 2017 11:00 AM EDT Return OB with Dominick Rod MD  
[facility] ([address] [address] [phone] Current Discharge Medication List  
  
ASK your doctor about these medications Dose & Instructions Dispensing Information Comments Morning Noon Evening Bedtime  
 calcium carbonate 200 mg calcium (500 mg) Chew Commonly known as:  TUMS Your last dose was: Your next dose is:    
   
   
 Dose:  1 Tab Take 1 Tab by mouth as needed. Indications: HEARTBURN Refills:  0 CLARITIN 10 mg tablet Generic drug:  loratadine Your last dose was: Your next dose is:    
   
   
 Dose:  10 mg Take 10 mg by mouth. Refills:  0 PNV#16-Iron Fum & PS-FA-OM-3 35-1-200 mg Cap Your last dose was: Your next dose is: Take  by mouth. Refills:  0  
     
   
   
   
  
 raNITIdine 150 mg tablet Commonly known as:  ZANTAC Your last dose was: Your next dose is:    
   
   
 Dose:  150 mg Take 1 Tab by mouth two (2) times a day. Quantity:  60 Tab Refills:  12 Discharge Instructions If you experience any of the following symptoms bright red vaginal bleeding, gush of fluid, decreased fetal movement, uterine contractions ten minutes or less, or any other concerns, please follow up with [address] [place] [address] Delivery or physicians office. Pregnancy Precautions: Care Instructions Your Care Instructions There is no sure way to prevent labor before your due date ( labor) or to prevent most other pregnancy problems. But there are things you can do to increase your chances of a healthy pregnancy. Go to your appointments, follow your doctor's advice, and take good care of yourself. Eat well, and exercise (if your doctor agrees). And make sure to drink plenty of water. Follow-up care is a key part of your treatment and safety. Be sure to make and go to all appointments, and call your doctor if you are having problems. It's also a good idea to know your test results and keep a list of the medicines you take. How can you care for yourself at home? · Make sure you go to your prenatal appointments. At each visit, your doctor will check your blood pressure. Your doctor will also check to see if you have protein in your urine. High blood pressure and protein in urine are signs of preeclampsia. This condition can be dangerous for you and your baby. · Drink plenty of fluids, enough so that your urine is light yellow or clear like water. Dehydration can cause contractions. If you have kidney, heart, or liver disease and have to limit fluids, talk with your doctor before you increase the amount of fluids you drink. · Tell your doctor right away if you notice any symptoms of an infection, such as: ¨ Burning when you urinate. ¨ A foul-smelling discharge from your vagina. ¨ Vaginal itching. ¨ Unexplained fever. ¨ Unusual pain or soreness in your uterus or lower belly. · Eat a balanced diet. Include plenty of foods that are high in calcium and iron. ¨ Foods high in calcium include milk, cheese, yogurt, almonds, and broccoli. ¨ Foods high in iron include red meat, shellfish, poultry, eggs, beans, raisins, whole-grain bread, and leafy green vegetables. · Do not smoke. If you need help quitting, talk to your doctor about stop-smoking programs and medicines. These can increase your chances of quitting for good. · Do not drink alcohol or use illegal drugs. · Follow your doctor's directions about activity. Your doctor will let you know how much, if any, exercise you can do. · Ask your doctor if you can have sex. If you are at risk for early labor, your doctor may ask you to not have sex. · Take care to prevent falls. During pregnancy, your joints are loose, and your balance is off. Sports such as bicycling, skiing, or in-line skating can increase your risk of falling. And don't ride horses or motorcycles, dive, water ski, scuba dive, or parachute jump while you are pregnant. · Avoid getting very hot. Do not use saunas or hot tubs. Avoid staying out in the sun in hot weather for long periods. Take acetaminophen (Tylenol) to lower a high fever. · Do not take any over-the-counter or herbal medicines or supplements without talking to your doctor or pharmacist first. 
When should you call for help? Call 911 anytime you think you may need emergency care. For example, call if: 
· You passed out (lost consciousness). · You have severe vaginal bleeding. · You have severe pain in your belly or pelvis. · You have had fluid gushing or leaking from your vagina and you know or think the umbilical cord is bulging into your vagina. If this happens, immediately get down on your knees so your rear end (buttocks) is higher than your head. This will decrease the pressure on the cord until help arrives. Call your doctor now or seek immediate medical care if: 
· You have signs of preeclampsia, such as: 
¨ Sudden swelling of your face, hands, or feet. ¨ New vision problems (such as dimness or blurring). ¨ A severe headache. · You have any vaginal bleeding. · You have belly pain or cramping. · You have a fever. · You have had regular contractions (with or without pain) for an hour. This means that you have 8 or more within 1 hour or 4 or more in 20 minutes after you change your position and drink fluids. · You have a sudden release of fluid from your vagina. · You have low back pain or pelvic pressure that does not go away. · You notice that your baby has stopped moving or is moving much less than normal. 
Watch closely for changes in your health, and be sure to contact your doctor if you have any problems. Where can you learn more? Go to http://danielle-lcuia.info/. Enter [id number] in the search box to learn more about \"Pregnancy Precautions: Care Instructions. \" Current as of: March 16, 2017 Content Version: 11.3 © 0869-6452 [employer]. Care instructions adapted under license by [employer] (which disclaims liability or warranty for this information). If you have questions about a medical condition or this instruction, always ask your healthcare professional. [address] any warranty or liability for your use of this information. Week 39 of Your Pregnancy: Care Instructions Your Care Instructions During these final weeks, you may feel anxious to see your new baby.  babies often look different from what you see in pictures or movies. Right after birth, their heads may have a strange shape. Their eyes may be puffy. And their genitals may be swollen. They may also have very dry skin, or red marks on the eyelids, nose, or neck. Still, most parents think their babies are beautiful. Follow-up care is a key part of your treatment and safety. Be sure to make and go to all appointments, and call your doctor if you are having problems. It's also a good idea to know your test results and keep a list of the medicines you take. How can you care for yourself at home? Prepare to breastfeed · If you are breastfeeding, continue to eat healthy foods. · Avoid alcohol, cigarettes, and drugs. This includes prescription and over-the-counter medicines. · You can help prevent sore nipples if you feed your baby in the correct position. Nurses will help you learn to do this. · Your  will need to be fed about every 1½ to 3 hours. Choose the right birth control after your baby is born · Women who are breastfeeding can still get pregnant. Use birth control if you don't want to get pregnant. · Intrauterine devices (IUDs) work for women who want to wait at least 2 years before getting pregnant again. They are safe to use while you are breastfeeding. · Depo-Provera can be used while you are breastfeeding. It is a shot you get every 3 months. · Birth control pills work well. But you need a different kind of pill while you are breastfeeding. And when you start taking these pills, you need to make sure to use another type of birth control until you start your second pack. · Diaphragms, cervical caps, tubal implants, and condoms with spermicide work less well after birth.  If you have a diaphragm or cervical cap, you will need to have it refitted. · Tubal ligation (tying your tubes) and vasectomy are both permanent. These are good options if you are sure you are done having children. Where can you learn more? Go to http://danielle-lucia.info/. Enter S762 in the search box to learn more about \"Week 39 of Your Pregnancy: Care Instructions. \" Current as of: March 16, 2017 Content Version: 11.3 © 7297-9561 [employer]. Care instructions adapted under license by [employer] (which disclaims liability or warranty for this information). If you have questions about a medical condition or this instruction, always ask your healthcare professional. [place] [address] any warranty or liability for your use of this information. Discharge Orders None [facility] SERVICES! Dear Peggy Tsai: Thank you for requesting a Vartopia account. Our records indicate that you already have an active Vartopia account. You can access your account anytime at [email or web address] Did you know that you can access your hospital and ER discharge instructions at any time in Vartopia? You can also review all of your test results from your hospital stay or ER visit. Additional Information If you have questions, please visit the Frequently Asked Questions section of the Vartopia website at [email or web address] Remember, Vartopia is NOT to be used for urgent needs. For medical emergencies, dial 911. Now available from your iPhone and Android! General Information Please provide this summary of care documentation to your next provider. Patient Signature:  ____________________________________________________________ Date:  ____________________________________________________________  
  
Meryl Weeks Provider Signature:  ____________________________________________________________ Date:  ____________________________________________________________

## 2017-07-29 NOTE — PROGRESS NOTES
Pt in triage OBED2 with c/o UCs. Pt denies HA, visual disturbances, RUQ pain, nausea, VB, LOF. EFM/TOCO tested and applied. Abdomen palpated soft and non-tender. +FM.  Will notify [facility] Hospitalist of patient arrival.

## 2017-07-29 NOTE — DISCHARGE INSTRUCTIONS
If you experience any of the following symptoms bright red vaginal bleeding, gush of fluid, decreased fetal movement, uterine contractions ten minutes or less, or any other concerns, please follow up with [address] [place] [address] Delivery or physicians office. Pregnancy Precautions: Care Instructions  Your Care Instructions  There is no sure way to prevent labor before your due date ( labor) or to prevent most other pregnancy problems. But there are things you can do to increase your chances of a healthy pregnancy. Go to your appointments, follow your doctor's advice, and take good care of yourself. Eat well, and exercise (if your doctor agrees). And make sure to drink plenty of water. Follow-up care is a key part of your treatment and safety. Be sure to make and go to all appointments, and call your doctor if you are having problems. It's also a good idea to know your test results and keep a list of the medicines you take. How can you care for yourself at home? · Make sure you go to your prenatal appointments. At each visit, your doctor will check your blood pressure. Your doctor will also check to see if you have protein in your urine. High blood pressure and protein in urine are signs of preeclampsia. This condition can be dangerous for you and your baby. · Drink plenty of fluids, enough so that your urine is light yellow or clear like water. Dehydration can cause contractions. If you have kidney, heart, or liver disease and have to limit fluids, talk with your doctor before you increase the amount of fluids you drink. · Tell your doctor right away if you notice any symptoms of an infection, such as:  ¨ Burning when you urinate. ¨ A foul-smelling discharge from your vagina. ¨ Vaginal itching. ¨ Unexplained fever. ¨ Unusual pain or soreness in your uterus or lower belly. · Eat a balanced diet. Include plenty of foods that are high in calcium and iron.   ¨ Foods high in calcium include milk, cheese, yogurt, almonds, and broccoli. ¨ Foods high in iron include red meat, shellfish, poultry, eggs, beans, raisins, whole-grain bread, and leafy green vegetables. · Do not smoke. If you need help quitting, talk to your doctor about stop-smoking programs and medicines. These can increase your chances of quitting for good. · Do not drink alcohol or use illegal drugs. · Follow your doctor's directions about activity. Your doctor will let you know how much, if any, exercise you can do. · Ask your doctor if you can have sex. If you are at risk for early labor, your doctor may ask you to not have sex. · Take care to prevent falls. During pregnancy, your joints are loose, and your balance is off. Sports such as bicycling, skiing, or in-line skating can increase your risk of falling. And don't ride horses or motorcycles, dive, water ski, scuba dive, or parachute jump while you are pregnant. · Avoid getting very hot. Do not use saunas or hot tubs. Avoid staying out in the sun in hot weather for long periods. Take acetaminophen (Tylenol) to lower a high fever. · Do not take any over-the-counter or herbal medicines or supplements without talking to your doctor or pharmacist first.  When should you call for help? Call 911 anytime you think you may need emergency care. For example, call if:  · You passed out (lost consciousness). · You have severe vaginal bleeding. · You have severe pain in your belly or pelvis. · You have had fluid gushing or leaking from your vagina and you know or think the umbilical cord is bulging into your vagina. If this happens, immediately get down on your knees so your rear end (buttocks) is higher than your head. This will decrease the pressure on the cord until help arrives. Call your doctor now or seek immediate medical care if:  · You have signs of preeclampsia, such as:  ¨ Sudden swelling of your face, hands, or feet.   ¨ New vision problems (such as dimness or blurring). ¨ A severe headache. · You have any vaginal bleeding. · You have belly pain or cramping. · You have a fever. · You have had regular contractions (with or without pain) for an hour. This means that you have 8 or more within 1 hour or 4 or more in 20 minutes after you change your position and drink fluids. · You have a sudden release of fluid from your vagina. · You have low back pain or pelvic pressure that does not go away. · You notice that your baby has stopped moving or is moving much less than normal.  Watch closely for changes in your health, and be sure to contact your doctor if you have any problems. Where can you learn more? Go to http://danielle-lucia.info/. Enter [id number] in the search box to learn more about \"Pregnancy Precautions: Care Instructions. \"  Current as of: 2017  Content Version: 11.3  © 3516-9712 [employer]. Care instructions adapted under license by [employer] (which disclaims liability or warranty for this information). If you have questions about a medical condition or this instruction, always ask your healthcare professional. [place] [address] any warranty or liability for your use of this information. Week 39 of Your Pregnancy: Care Instructions  Your Care Instructions    During these final weeks, you may feel anxious to see your new baby. Elmer babies often look different from what you see in pictures or movies. Right after birth, their heads may have a strange shape. Their eyes may be puffy. And their genitals may be swollen. They may also have very dry skin, or red marks on the eyelids, nose, or neck. Still, most parents think their babies are beautiful. Follow-up care is a key part of your treatment and safety. Be sure to make and go to all appointments, and call your doctor if you are having problems.  It's also a good idea to know your test results and keep a list of the medicines you take.  How can you care for yourself at home? Prepare to breastfeed  · If you are breastfeeding, continue to eat healthy foods. · Avoid alcohol, cigarettes, and drugs. This includes prescription and over-the-counter medicines. · You can help prevent sore nipples if you feed your baby in the correct position. Nurses will help you learn to do this. · Your  will need to be fed about every 1½ to 3 hours. Choose the right birth control after your baby is born  · Women who are breastfeeding can still get pregnant. Use birth control if you don't want to get pregnant. · Intrauterine devices (IUDs) work for women who want to wait at least 2 years before getting pregnant again. They are safe to use while you are breastfeeding. · Depo-Provera can be used while you are breastfeeding. It is a shot you get every 3 months. · Birth control pills work well. But you need a different kind of pill while you are breastfeeding. And when you start taking these pills, you need to make sure to use another type of birth control until you start your second pack. · Diaphragms, cervical caps, tubal implants, and condoms with spermicide work less well after birth. If you have a diaphragm or cervical cap, you will need to have it refitted. · Tubal ligation (tying your tubes) and vasectomy are both permanent. These are good options if you are sure you are done having children. Where can you learn more? Go to http://danielle-lucia.info/. Enter R396 in the search box to learn more about \"Week 39 of Your Pregnancy: Care Instructions. \"  Current as of: 2017  Content Version: 11.3  © 7525-3746 [employer]. Care instructions adapted under license by [employer] (which disclaims liability or warranty for this information).  If you have questions about a medical condition or this instruction, always ask your healthcare professional. Zola Ormond disclaims any warranty or liability for your use of this information.

## 2017-07-29 NOTE — PROGRESS NOTES
Dr. Vaishnavi Davis at bedside, strip reviewed by MD. Blade Zayas 4/50/-1 per MD. See MD note. MD orders received for d/c and pain medication. See MAR.

## 2017-07-29 NOTE — PROGRESS NOTES
Dr. Jayleen Sutton called on phone and notified of triage patient in [address] [place] [address] 1 with c/o UCs, gestational age, history. MD will come see patient.

## 2017-07-29 NOTE — PROGRESS NOTES
Discharge instructions given to patient and significant other, verbalize understanding and agreement. Questions answered accordingly. Deny and needs or concerns. 2345 Oxycodone given, see MAR  2350 Pt in no apparent distress, in stable condition, and undelivered. Pt ambulatory to private vehicle with significant other at side.

## 2017-07-29 NOTE — H&P
CC  Chief Complaint   Patient presents with    Contractions       History:    32 y.o. female at 39w0d weeks gestation who requesting evaluation for Uterine contractions. States have been every 4-5 minutes at home. No vb/lof. No ha or vision changes. Rates pain 8/10. Fetal movement has been normal  HISTORY:  OB History    Para Term  AB Living   1        SAB TAB Ectopic Molar Multiple Live Births              # Outcome Date GA Lbr Rodolfo/2nd Weight Sex Delivery Anes PTL Lv   1 Current                   History   Sexual Activity    Sexual activity: Yes    Partners: Male    Birth control/ protection: None     Patient's last menstrual period was 10/04/2016. Social History     Social History    Marital status: SINGLE     Spouse name: N/A    Number of children: N/A    Years of education: N/A     Occupational History    Not on file. Social History Main Topics    Smoking status: Former Smoker    Smokeless tobacco: Never Used      Comment: Pt trying to quit    Alcohol use No    Drug use: No    Sexual activity: Yes     Partners: Male     Birth control/ protection: None     Other Topics Concern    Not on file     Social History Narrative       Past Surgical History:   Procedure Laterality Date    HX DILATION AND CURETTAGE      Growth removed from uterus    HX WISDOM TEETH EXTRACTION         Past Medical History:   Diagnosis Date    Abnormal Papanicolaou smear of cervix     years ago/WNL since    HPV in female          ROS:  Negative:  headache , nausea and vomiting, vaginal bleeding  and visual disturbances. Positive:  contractions. PHYSICAL EXAM:  Blood pressure (!) 147/96, pulse (!) 109, temperature 98.2 °F (36.8 °C), resp. rate 18, height 5' 5\" (1.651 m), weight 81.6 kg (180 lb), last menstrual period 10/04/2016. General: well developed and well nourished, no distress  Resp:  breath sounds clear and equal bilaterally  Card:  RRR, no MRG  Abd: WNL.  Palpated uterus during contraction, mild    Uterine contractions: irregular, every 4-7 minutes    Fetal Assessment: Baseline FHR: 150 per minute     Fetal heart variability: moderate     Fetal Heart Rate decelerations: none     Fetal Heart Rate accelerations: yes     Prestentation: vertex by exam,    Pelvic:   External- normal EGBSU w/o lesions     SVE- Cervical Exam: 4 cm dilated    50% effaced    -1 station    Presenting Part: cephalic     Ext: edema, clonus and DTR's normal    Assessment:  32 y.o. female at 39w0d weeks gestation  Reassuring fetal status  Early latent labor. Plan:  discussed option of ambulation here with recheck versus going home to return if ctx increase in freq and intensity.  discussed labor contractions uterus palpation etc. patient lives close by and prefers to go home., Discharge home with routine labor instructions and warning signs, Keep follow up appointment with regular provider as scheduled and Instructed in fetal activity monitoring    Steve Johnson MD

## 2017-08-04 ENCOUNTER — ANESTHESIA (OUTPATIENT)
Dept: LABOR AND DELIVERY | Age: 27
End: 2017-08-04
Payer: COMMERCIAL

## 2017-08-04 ENCOUNTER — HOSPITAL ENCOUNTER (INPATIENT)
Age: 27
LOS: 1 days | Discharge: HOME | End: 2017-08-05
Attending: OBSTETRICS & GYNECOLOGY | Admitting: OBSTETRICS & GYNECOLOGY
Payer: COMMERCIAL

## 2017-08-04 ENCOUNTER — ANESTHESIA EVENT (OUTPATIENT)
Dept: LABOR AND DELIVERY | Age: 27
End: 2017-08-04
Payer: COMMERCIAL

## 2017-08-04 LAB
ABO + RH BLD: NORMAL
BASE DEFICIT BLDCOA-SCNC: 2.1 MMOL/L (ref 0–2)
BASE DEFICIT BLDCOV-SCNC: 2.5 MMOL/L (ref 1.9–7.7)
BDY SITE: ABNORMAL
BDY SITE: ABNORMAL
BLOOD GROUP ANTIBODIES SERPL: NORMAL
ERYTHROCYTE [DISTWIDTH] IN BLOOD BY AUTOMATED COUNT: 13.2 % (ref 11.9–14.6)
HCO3 BLDCOA-SCNC: 21 MMOL/L (ref 22–26)
HCO3 BLDV-SCNC: 21 MMOL/L
HCT VFR BLD AUTO: 40.1 % (ref 35.8–46.3)
HGB BLD-MCNC: 13.4 G/DL (ref 11.7–15.4)
MCH RBC QN AUTO: 32.7 PG (ref 26.1–32.9)
MCHC RBC AUTO-ENTMCNC: 33.4 G/DL (ref 31.4–35)
MCV RBC AUTO: 97.8 FL (ref 79.6–97.8)
PCO2 BLDCOA: 33 MMHG (ref 33–49)
PCO2 BLDCOV: 31 MMHG (ref 14.1–43.3)
PH BLDCOA: 7.43 [PH] (ref 7.21–7.31)
PH BLDCOV: 7.44 [PH] (ref 7.2–7.44)
PLATELET # BLD AUTO: 177 K/UL (ref 150–450)
PMV BLD AUTO: 11.7 FL (ref 10.8–14.1)
PO2 BLDCOA: 30 MMHG (ref 9–19)
PO2 BLDV: 30 MMHG (ref 30.4–57.2)
RBC # BLD AUTO: 4.1 M/UL (ref 4.05–5.25)
SERVICE CMNT-IMP: ABNORMAL
SERVICE CMNT-IMP: ABNORMAL
SPECIMEN EXP DATE BLD: NORMAL
WBC # BLD AUTO: 11.3 K/UL (ref 4.3–11.1)

## 2017-08-04 PROCEDURE — 75410000003 HC RECOV DEL/VAG/CSECN EA 0.5 HR

## 2017-08-04 PROCEDURE — 99282 EMERGENCY DEPT VISIT SF MDM: CPT

## 2017-08-04 PROCEDURE — 86900 BLOOD TYPING SEROLOGIC ABO: CPT | Performed by: OBSTETRICS & GYNECOLOGY

## 2017-08-04 PROCEDURE — 77030011943

## 2017-08-04 PROCEDURE — 82803 BLOOD GASES ANY COMBINATION: CPT

## 2017-08-04 PROCEDURE — 76060000078 HC EPIDURAL ANESTHESIA

## 2017-08-04 PROCEDURE — 74011250636 HC RX REV CODE- 250/636: Performed by: OBSTETRICS & GYNECOLOGY

## 2017-08-04 PROCEDURE — 74011250636 HC RX REV CODE- 250/636

## 2017-08-04 PROCEDURE — 77030011945 HC CATH URIN INT ST MENT -A

## 2017-08-04 PROCEDURE — 59025 FETAL NON-STRESS TEST: CPT

## 2017-08-04 PROCEDURE — 65270000029 HC RM PRIVATE

## 2017-08-04 PROCEDURE — 75410000002 HC LABOR FEE PER 1 HR

## 2017-08-04 PROCEDURE — 74011258636 HC RX REV CODE- 258/636: Performed by: OBSTETRICS & GYNECOLOGY

## 2017-08-04 PROCEDURE — 77030014125 HC TY EPDRL BBMI -B: Performed by: ANESTHESIOLOGY

## 2017-08-04 PROCEDURE — 77030003028 HC SUT VCRL J&J -A

## 2017-08-04 PROCEDURE — 4A1HXCZ MONITORING OF PRODUCTS OF CONCEPTION, CARDIAC RATE, EXTERNAL APPROACH: ICD-10-PCS | Performed by: OBSTETRICS & GYNECOLOGY

## 2017-08-04 PROCEDURE — 85027 COMPLETE CBC AUTOMATED: CPT | Performed by: OBSTETRICS & GYNECOLOGY

## 2017-08-04 PROCEDURE — 74011250637 HC RX REV CODE- 250/637: Performed by: OBSTETRICS & GYNECOLOGY

## 2017-08-04 PROCEDURE — 0UQGXZZ REPAIR VAGINA, EXTERNAL APPROACH: ICD-10-PCS | Performed by: OBSTETRICS & GYNECOLOGY

## 2017-08-04 PROCEDURE — 75410000000 HC DELIVERY VAGINAL/SINGLE

## 2017-08-04 PROCEDURE — 77030018846 HC SOL IRR STRL H20 ICUM -A

## 2017-08-04 RX ORDER — IBUPROFEN 400 MG/1
400 TABLET ORAL
Status: DISCONTINUED | OUTPATIENT
Start: 2017-08-04 | End: 2017-08-05

## 2017-08-04 RX ORDER — PROMETHAZINE HYDROCHLORIDE 25 MG/1
25 TABLET ORAL
Status: DISCONTINUED | OUTPATIENT
Start: 2017-08-04 | End: 2017-08-05 | Stop reason: HOSPADM

## 2017-08-04 RX ORDER — OXYTOCIN/RINGER'S LACTATE 15/250 ML
250 PLASTIC BAG, INJECTION (ML) INTRAVENOUS ONCE
Status: COMPLETED | OUTPATIENT
Start: 2017-08-04 | End: 2017-08-04

## 2017-08-04 RX ORDER — ROPIVACAINE HYDROCHLORIDE 2 MG/ML
INJECTION, SOLUTION EPIDURAL; INFILTRATION; PERINEURAL
Status: DISCONTINUED | OUTPATIENT
Start: 2017-08-04 | End: 2017-08-04 | Stop reason: HOSPADM

## 2017-08-04 RX ORDER — DOCUSATE SODIUM 100 MG/1
100 CAPSULE, LIQUID FILLED ORAL 2 TIMES DAILY
Status: DISCONTINUED | OUTPATIENT
Start: 2017-08-04 | End: 2017-08-05 | Stop reason: HOSPADM

## 2017-08-04 RX ORDER — SIMETHICONE 80 MG
80 TABLET,CHEWABLE ORAL
Status: DISCONTINUED | OUTPATIENT
Start: 2017-08-04 | End: 2017-08-05 | Stop reason: HOSPADM

## 2017-08-04 RX ORDER — HYDROMORPHONE HYDROCHLORIDE 1 MG/ML
1 INJECTION, SOLUTION INTRAMUSCULAR; INTRAVENOUS; SUBCUTANEOUS
Status: DISCONTINUED | OUTPATIENT
Start: 2017-08-04 | End: 2017-08-05 | Stop reason: HOSPADM

## 2017-08-04 RX ORDER — OXYTOCIN/RINGER'S LACTATE 15/250 ML
250 PLASTIC BAG, INJECTION (ML) INTRAVENOUS
Status: DISCONTINUED | OUTPATIENT
Start: 2017-08-04 | End: 2017-08-04 | Stop reason: ALTCHOICE

## 2017-08-04 RX ORDER — LIDOCAINE HYDROCHLORIDE 10 MG/ML
1 INJECTION INFILTRATION; PERINEURAL
Status: DISCONTINUED | OUTPATIENT
Start: 2017-08-04 | End: 2017-08-04 | Stop reason: HOSPADM

## 2017-08-04 RX ORDER — MINERAL OIL
120 OIL (ML) ORAL
Status: COMPLETED | OUTPATIENT
Start: 2017-08-04 | End: 2017-08-04

## 2017-08-04 RX ORDER — NALOXONE HYDROCHLORIDE 0.4 MG/ML
0.4 INJECTION, SOLUTION INTRAMUSCULAR; INTRAVENOUS; SUBCUTANEOUS AS NEEDED
Status: DISCONTINUED | OUTPATIENT
Start: 2017-08-04 | End: 2017-08-05 | Stop reason: HOSPADM

## 2017-08-04 RX ORDER — OXYCODONE HYDROCHLORIDE 5 MG/1
5 TABLET ORAL
Status: DISCONTINUED | OUTPATIENT
Start: 2017-08-04 | End: 2017-08-05

## 2017-08-04 RX ORDER — SODIUM CHLORIDE 0.9 % (FLUSH) 0.9 %
5-10 SYRINGE (ML) INJECTION EVERY 8 HOURS
Status: DISCONTINUED | OUTPATIENT
Start: 2017-08-04 | End: 2017-08-05

## 2017-08-04 RX ORDER — SODIUM CHLORIDE 0.9 % (FLUSH) 0.9 %
5-10 SYRINGE (ML) INJECTION AS NEEDED
Status: DISCONTINUED | OUTPATIENT
Start: 2017-08-04 | End: 2017-08-05

## 2017-08-04 RX ORDER — DEXTROSE, SODIUM CHLORIDE, SODIUM LACTATE, POTASSIUM CHLORIDE, AND CALCIUM CHLORIDE 5; .6; .31; .03; .02 G/100ML; G/100ML; G/100ML; G/100ML; G/100ML
125 INJECTION, SOLUTION INTRAVENOUS CONTINUOUS
Status: DISCONTINUED | OUTPATIENT
Start: 2017-08-04 | End: 2017-08-04 | Stop reason: ALTCHOICE

## 2017-08-04 RX ORDER — BUTORPHANOL TARTRATE 1 MG/ML
1 INJECTION INTRAMUSCULAR; INTRAVENOUS
Status: DISCONTINUED | OUTPATIENT
Start: 2017-08-04 | End: 2017-08-04 | Stop reason: HOSPADM

## 2017-08-04 RX ORDER — LIDOCAINE HYDROCHLORIDE 20 MG/ML
JELLY TOPICAL
Status: DISCONTINUED | OUTPATIENT
Start: 2017-08-04 | End: 2017-08-04 | Stop reason: HOSPADM

## 2017-08-04 RX ORDER — DIPHENHYDRAMINE HCL 25 MG
25 CAPSULE ORAL
Status: DISCONTINUED | OUTPATIENT
Start: 2017-08-04 | End: 2017-08-05 | Stop reason: HOSPADM

## 2017-08-04 RX ORDER — ROPIVACAINE HYDROCHLORIDE 2 MG/ML
INJECTION, SOLUTION EPIDURAL; INFILTRATION; PERINEURAL AS NEEDED
Status: DISCONTINUED | OUTPATIENT
Start: 2017-08-04 | End: 2017-08-04 | Stop reason: HOSPADM

## 2017-08-04 RX ORDER — ZOLPIDEM TARTRATE 5 MG/1
5 TABLET ORAL
Status: DISCONTINUED | OUTPATIENT
Start: 2017-08-04 | End: 2017-08-05 | Stop reason: HOSPADM

## 2017-08-04 RX ADMIN — OXYCODONE HYDROCHLORIDE 5 MG: 5 TABLET ORAL at 16:33

## 2017-08-04 RX ADMIN — Medication 250 ML/HR: at 08:24

## 2017-08-04 RX ADMIN — OXYCODONE HYDROCHLORIDE 5 MG: 5 TABLET ORAL at 10:28

## 2017-08-04 RX ADMIN — SODIUM CHLORIDE, SODIUM LACTATE, POTASSIUM CHLORIDE, CALCIUM CHLORIDE, AND DEXTROSE MONOHYDRATE 125 ML/HR: 600; 310; 30; 20; 5 INJECTION, SOLUTION INTRAVENOUS at 03:20

## 2017-08-04 RX ADMIN — Medication 15000 MILLI-UNITS/HR: at 06:54

## 2017-08-04 RX ADMIN — SODIUM CHLORIDE, SODIUM LACTATE, POTASSIUM CHLORIDE, AND CALCIUM CHLORIDE 500 ML: 600; 310; 30; 20 INJECTION, SOLUTION INTRAVENOUS at 03:29

## 2017-08-04 RX ADMIN — OXYCODONE HYDROCHLORIDE 5 MG: 5 TABLET ORAL at 20:34

## 2017-08-04 RX ADMIN — IBUPROFEN 400 MG: 400 TABLET, FILM COATED ORAL at 16:32

## 2017-08-04 RX ADMIN — HYDROMORPHONE HYDROCHLORIDE 1 MG: 1 INJECTION, SOLUTION INTRAMUSCULAR; INTRAVENOUS; SUBCUTANEOUS at 12:41

## 2017-08-04 RX ADMIN — WITCH HAZEL 1 PAD: 500 SOLUTION RECTAL; TOPICAL at 10:28

## 2017-08-04 RX ADMIN — MINERAL OIL 120 ML: 471.95 OIL ORAL at 06:20

## 2017-08-04 RX ADMIN — DOCUSATE SODIUM 100 MG: 100 CAPSULE, LIQUID FILLED ORAL at 16:33

## 2017-08-04 RX ADMIN — IBUPROFEN 400 MG: 400 TABLET, FILM COATED ORAL at 20:34

## 2017-08-04 RX ADMIN — DOCUSATE SODIUM 100 MG: 100 CAPSULE, LIQUID FILLED ORAL at 10:28

## 2017-08-04 RX ADMIN — ROPIVACAINE HYDROCHLORIDE 8 ML: 2 INJECTION, SOLUTION EPIDURAL; INFILTRATION; PERINEURAL at 03:44

## 2017-08-04 RX ADMIN — ROPIVACAINE HYDROCHLORIDE 10 ML/HR: 2 INJECTION, SOLUTION EPIDURAL; INFILTRATION; PERINEURAL at 03:44

## 2017-08-04 RX ADMIN — IBUPROFEN 400 MG: 400 TABLET, FILM COATED ORAL at 10:29

## 2017-08-04 NOTE — PROGRESS NOTES
SBAR OUT Report: Mother    Verbal report given to Keo Smith RN  (full name & credentials) on this patient, who is now being transferred to MIU (unit) for routine progression of care. The patient is not wearing a green \"Anesthesia-Duramorph\" band. Report consisted of patient's Situation, Background, Assessment and Recommendations (SBAR). Lowber ID bands were compared with the identification form, and verified with the patient and receiving nurse. Information from the SBAR and the [address] [facility] Report was reviewed with the receiving nurse; opportunity for questions and clarification provided.

## 2017-08-04 NOTE — IP AVS SNAPSHOT
[address] [place]  
[phone] Patient: Karie Gates MRN: [id number] KKM:4/04/0058 You are allergic to the following Allergen Reactions Sulfa (Sulfonamide Antibiotics) Nausea and Vomiting Immunizations Administered for This Admission Name Date Tdap 8/5/2017 Recent Documentation Breastfeeding? OB Status Smoking Status Yes Recent pregnancy Former Smoker Emergency Contacts Name Discharge Info Relation Home Work Mobile Beena Hudson  Boyfriend [17] [phone] Sun Zhao  Sister [23] [phone] About your hospitalization You were admitted on:  August 4, 2017 You last received care in the:  [address] [facility] You were discharged on:  August 5, 2017 Unit phone number:  [phone] Why you were hospitalized Your primary diagnosis was:  Normal Labor Providers Seen During Your Hospitalizations Provider Role Specialty Primary office phone Jeremiah Dixon MD Attending Provider Obstetrics & Gynecology [phone] Your Primary Care Physician (PCP) Primary Care Physician Office Phone Office Fax NONE ** None ** ** None ** Follow-up Information Follow up With Details Comments Contact Info None   None ([phone]) Patient stated that they have no PCP Thierry Queen MD In 6 weeks  [address] [place] [address] 
[phone] Current Discharge Medication List  
  
START taking these medications Dose & Instructions Dispensing Information Comments Morning Noon Evening Bedtime  
 ibuprofen 800 mg tablet Commonly known as:  MOTRIN Your last dose was: Your next dose is:    
   
   
 Dose:  800 mg Take 1 Tab by mouth every eight (8) hours as needed. Quantity:  100 Tab Refills:  2  
     
   
   
   
  
 oxyCODONE-acetaminophen 5-325 mg per tablet Commonly known as:  PERCOCET Your last dose was: Your next dose is:    
   
   
 Dose:  1-2 Tab Take 1-2 Tabs by mouth every six (6) hours as needed. Quantity:  40 Tab Refills:  0 CONTINUE these medications which have NOT CHANGED Dose & Instructions Dispensing Information Comments Morning Noon Evening Bedtime  
 calcium carbonate 200 mg calcium (500 mg) Chew Commonly known as:  TUMS Your last dose was: Your next dose is:    
   
   
 Dose:  1 Tab Take 1 Tab by mouth as needed. Indications: HEARTBURN Refills:  0 CLARITIN 10 mg tablet Generic drug:  loratadine Your last dose was: Your next dose is:    
   
   
 Dose:  10 mg Take 10 mg by mouth. Refills:  0 PNV#16-Iron Fum & PS-FA-OM-3 35-1-200 mg Cap Your last dose was: Your next dose is: Take  by mouth. Refills:  0  
     
   
   
   
  
 raNITIdine 150 mg tablet Commonly known as:  ZANTAC Your last dose was: Your next dose is:    
   
   
 Dose:  150 mg Take 1 Tab by mouth two (2) times a day. Quantity:  60 Tab Refills:  12 Where to Get Your Medications Information on where to get these meds will be given to you by the nurse or doctor. ! Ask your nurse or doctor about these medications  
  ibuprofen 800 mg tablet  
 oxyCODONE-acetaminophen 5-325 mg per tablet Discharge Instructions Discharge instruction to follow: Activity: Pelvis rest for 6 weeks No heavy lifting over 15 lbs for 2 weeks No driving for 2 weeks No push/pull motion such as sweeping or vacuuming for 2 weeks No tub baths for 6 weeks If using jocelyn-bottle continue to use until comfortable stopping. Change sanitary pad after each urination or bowel movement. Call MD for the following: Fever over 101 F; pain not relieved by medication; foul smelling vaginal discharge or an increase in vaginal bleeding. Take medication as prescribed. Follow up with MD as order. After Your Delivery (the Postpartum Period): Care Instructions Your Care Instructions Congratulations on the birth of your baby. Like pregnancy, the  period can be a time of excitement, vidhi, and exhaustion. You may look at your wondrous little baby and feel happy. You may also be overwhelmed by your new sleep hours and new responsibilities. At first, babies often sleep during the days and are awake at night. They do not have a pattern or routine. They may make sudden gasps, jerk themselves awake, or look like they have crossed eyes. These are all normal, and they may even make you smile. In these first weeks after delivery, try to take good care of yourself. It may take 4 to 6 weeks to feel like yourself again, and possibly longer if you had a  birth. You will likely feel very tired for several weeks. Your days will be full of ups and downs, but lots of vidhi as well. Follow-up care is a key part of your treatment and safety. Be sure to make and go to all appointments, and call your doctor if you are having problems. It's also a good idea to know your test results and keep a list of the medicines you take. How can you care for yourself at home? Take care of your body after delivery · Use pads instead of tampons for the bloody flow that may last as long as 2 weeks. · Ease cramps with ibuprofen (Advil, Motrin). · Ease soreness of hemorrhoids and the area between your vagina and rectum with ice compresses or witch hazel pads. · Ease constipation by drinking lots of fluid and eating high-fiber foods. Ask your doctor about over-the-counter stool softeners. · Cleanse yourself with a gentle squeeze of warm water from a bottle instead of wiping with toilet paper. · Take a sitz bath in warm water several times a day. · Wear a good nursing bra. Ease sore and swollen breasts with warm, wet washcloths. · If you are not breastfeeding, use ice rather than heat for breast soreness. · Your period may not start for several months if you are breastfeeding. You may bleed more, and longer at first, than you did before you got pregnant. · Wait until you are healed (about 4 to 6 weeks) before you have sexual intercourse. Your doctor will tell you when it is okay to have sex. · Try not to travel with your baby for 5 or 6 weeks. If you take a long car trip, make frequent stops to walk around and stretch. Avoid exhaustion · Rest every day. Try to nap when your baby naps. · Ask another adult to be with you for a few days after delivery. · Plan for  if you have other children. · Stay flexible so you can eat at odd hours and sleep when you need to. Both you and your baby are making new schedules. · Plan small trips to get out of the house. Change can make you feel less tired. · Ask for help with housework, cooking, and shopping. Remind yourself that your job is to care for your baby. Know about help for postpartum depression · \"Baby blues\" are common for the first 1 to 2 weeks after birth. You may cry or feel sad or irritable for no reason. · Rest whenever you can. Being tired makes it harder to handle your emotions. · Go for walks with your baby. · Talk to your partner, friends, and family about your feelings. · If your symptoms last for more than a few weeks, or if you feel very depressed, ask your doctor for help. · Postpartum depression can be treated. Support groups and counseling can help. Sometimes medicine can also help. Stay healthy · Eat healthy foods so you have more energy, make good breast milk, and lose extra baby pounds. · If you breastfeed, avoid alcohol and drugs. Stay smoke-free. If you quit during pregnancy, congratulations. · Start daily exercise after 4 to 6 weeks, but rest when you feel tired. · Learn exercises to tone your belly. Do Kegel exercises to regain strength in your pelvic muscles. You can do these exercises while you stand or sit. ¨ Squeeze the same muscles you would use to stop your urine. Your belly and thighs should not move. ¨ Hold the squeeze for 3 seconds, and then relax for 3 seconds. ¨ Start with 3 seconds. Then add 1 second each week until you are able to squeeze for 10 seconds. ¨ Repeat the exercise 10 to 15 times for each session. Do three or more sessions each day. · Find a class for new mothers and new babies that has an exercise time. · If you had a  birth, give yourself a bit more time before you exercise, and be careful. When should you call for help? Call 911 anytime you think you may need emergency care. For example, call if: 
· You passed out (lost consciousness). Call your doctor now or seek immediate medical care if: 
· You have severe vaginal bleeding. This means you are passing blood clots and soaking through a pad each hour for 2 or more hours. · You are dizzy or lightheaded, or you feel like you may faint. · You have a fever. · You have new belly pain, or your pain gets worse. Watch closely for changes in your health, and be sure to contact your doctor if: 
· Your vaginal bleeding seems to be getting heavier. · You have new or worse vaginal discharge. · You feel sad, anxious, or hopeless for more than a few days. · You do not get better as expected. Where can you learn more? Go to http://danielle-lucia.info/. Enter A461 in the search box to learn more about \"After Your Delivery (the Postpartum Period): Care Instructions. \" Current as of: 2017 Content Version: 11.3 © 3340-3030 [employer].  Care instructions adapted under license by [employer] (which disclaims liability or warranty for this information). If you have questions about a medical condition or this instruction, always ask your healthcare professional. [address] any warranty or liability for your use of this information. Discharge Orders None [employer] Announcement We are excited to announce that we are making your provider's discharge notes available to you in [employer]. You will see these notes when they are completed and signed by the physician that discharged you from your recent hospital stay. If you have any questions or concerns about any information you see in [employer], please call the Health Information Department where you were seen or reach out to your Primary Care Provider for more information about your plan of care. Introducing [facility]! Dear Noah Schwab: Thank you for requesting a [employer] account. Our records indicate that you already have an active [employer] account. You can access your account anytime at [email or web address] Did you know that you can access your hospital and ER discharge instructions at any time in [employer]? You can also review all of your test results from your hospital stay or ER visit. Additional Information If you have questions, please visit the Frequently Asked Questions section of the [employer] website at [email or web address] Remember, [employer] is NOT to be used for urgent needs. For medical emergencies, dial 911. Now available from your iPhone and Android! General Information Please provide this summary of care documentation to your next provider. Patient Signature:  ____________________________________________________________ Date:  ____________________________________________________________  
  
Nancy Robbins Provider Signature:  ____________________________________________________________ Date:  ____________________________________________________________

## 2017-08-04 NOTE — IP AVS SNAPSHOT
Whitney Bach 
 
 
 [id number] 
[phone] Patient: Ching Fuller MRN: [id number] KJK:4/84/3247 Current Discharge Medication List  
  
START taking these medications Dose & Instructions Dispensing Information Comments Morning Noon Evening Bedtime  
 ibuprofen 800 mg tablet Commonly known as:  MOTRIN Your last dose was: Your next dose is:    
   
   
 Dose:  800 mg Take 1 Tab by mouth every eight (8) hours as needed. Quantity:  100 Tab Refills:  2  
     
   
   
   
  
 oxyCODONE-acetaminophen 5-325 mg per tablet Commonly known as:  PERCOCET Your last dose was: Your next dose is:    
   
   
 Dose:  1-2 Tab Take 1-2 Tabs by mouth every six (6) hours as needed. Quantity:  40 Tab Refills:  0 CONTINUE these medications which have NOT CHANGED Dose & Instructions Dispensing Information Comments Morning Noon Evening Bedtime  
 calcium carbonate 200 mg calcium (500 mg) Chew Commonly known as:  TUMS Your last dose was: Your next dose is:    
   
   
 Dose:  1 Tab Take 1 Tab by mouth as needed. Indications: HEARTBURN Refills:  0 CLARITIN 10 mg tablet Generic drug:  loratadine Your last dose was: Your next dose is:    
   
   
 Dose:  10 mg Take 10 mg by mouth. Refills:  0 PNV#16-Iron Fum & PS-FA-OM-3 35-1-200 mg Cap Your last dose was: Your next dose is: Take  by mouth. Refills:  0  
     
   
   
   
  
 raNITIdine 150 mg tablet Commonly known as:  ZANTAC Your last dose was: Your next dose is:    
   
   
 Dose:  150 mg Take 1 Tab by mouth two (2) times a day. Quantity:  60 Tab Refills:  12 Where to Get Your Medications Information on where to get these meds will be given to you by the nurse or doctor. ! Ask your nurse or doctor about these medications  
  ibuprofen 800 mg tablet  
 oxyCODONE-acetaminophen 5-325 mg per tablet

## 2017-08-04 NOTE — PROGRESS NOTES
provided education and pamphlet on [facility] Postpartum [place] [facility].  Family was undecided on need for home visit. No referral will be made at this time.   Family has this 's contact information should they decide to participate in program.      Erin Sharma, [address] [facility]

## 2017-08-04 NOTE — ANESTHESIA PREPROCEDURE EVALUATION
Anesthetic History   No history of anesthetic complications            Review of Systems / Medical History  Patient summary reviewed and pertinent labs reviewed    Pulmonary          Smoker         Neuro/Psych   Within defined limits           Cardiovascular                  Exercise tolerance: >4 METS     GI/Hepatic/Renal  Within defined limits              Endo/Other  Within defined limits           Other Findings              Physical Exam    Airway  Mallampati: II  TM Distance: > 6 cm  Neck ROM: normal range of motion   Mouth opening: Normal     Cardiovascular    Rhythm: regular           Dental  No notable dental hx       Pulmonary                 Abdominal  GI exam deferred       Other Findings            Anesthetic Plan    ASA: 2  Anesthesia type: epidural            Anesthetic plan and risks discussed with: Patient

## 2017-08-04 NOTE — PROGRESS NOTES
0354- Early decelerations noted on EFM. Patient feeling increase in pressure    0355- SVE (9/100/0)    0357- Lateral right side. Prolonged deceleration noted.  .    0358- Oxygen non re-breather 15 L/min.    0401- Lateral left side    0406- . (re-assuring)

## 2017-08-04 NOTE — H&P
Chief Complaint:painful contractions      32 y.o. female G1 at 40w0d  weeks gestation who is seen for painful contractions and srom. Contractions began around midnight with srom around 2:00 am. Reports that fluid was blood tinged. Also c/o nausea and vomiting tonight. No other c/o. Good fetal movement        HISTORY:    History   Sexual Activity    Sexual activity: Yes    Partners: Male    Birth control/ protection: None     Patient's last menstrual period was 10/04/2016. Social History     Social History    Marital status: SINGLE     Spouse name: N/A    Number of children: N/A    Years of education: N/A     Occupational History    Not on file. Social History Main Topics    Smoking status: Former Smoker    Smokeless tobacco: Never Used      Comment: Pt trying to quit    Alcohol use No    Drug use: No    Sexual activity: Yes     Partners: Male     Birth control/ protection: None     Other Topics Concern    Not on file     Social History Narrative       Past Surgical History:   Procedure Laterality Date    HX DILATION AND CURETTAGE  2011    Growth removed from uterus    HX WISDOM TEETH EXTRACTION         Past Medical History:   Diagnosis Date    Abnormal Papanicolaou smear of cervix     years ago/WNL since    HPV in female          ROS:  A 12 point review of symptoms negative except for chief complaint as described above. PHYSICAL EXAM:  Last menstrual period 10/04/2016.   140/76    Constitutional: The patient appears well, alert, oriented x 3. Cardiovascular: Heart RRR, no murmurs.    Respiratory: Lungs clear, no respiratory distress  GI: Abdomen soft, nontender, no guarding  No fundal tenderness  Musculoskeletal: no cva tenderness  Upper ext: no edema, reflexes +2  Lower ext: no edema, neg paola's, reflexes +2  Skin: no rashes or lesions  Psychiatric:Mood/ Affect: appropriate  Genitourinary: SVE:5/100/-1  FHT:reactive  TOCO:q 2-3    I personally reviewed pt's medical record including relevant labs and ultrasounds    Assessment/Plan:  33 yo G1 at 40 weeks with active labor and srom. Reactive nst.  Admit .  Requests epidural.

## 2017-08-04 NOTE — PROGRESS NOTES
Delivery Note    Dr Maral Cervantes arrived to bedside at 3236. Pt positioned for delivery and set up at 0645. Spontaneous vaginal delivery of viable FEMALE infant @ 0650. Removal of placenta at 2508, with no complications. Pitocin infusing per MD.    Apgar's 8 & 9. Infant's weight is 3215 grams and according to gestational age infant is AGA. Perineum with 1st degree and episiotomy and repaired. Rhianna-care and ice applied at this time. Skin to skin @ 4414    See delivery summary for details.

## 2017-08-04 NOTE — ANESTHESIA POSTPROCEDURE EVALUATION
Post-Anesthesia Evaluation and Assessment    Patient: Donnell Parikh MRN: [id number]  SSN: [id number]    YOB: 1990  Age: 32 y.o. Sex: female       Cardiovascular Function/Vital Signs  Visit Vitals    /67    Pulse 81    Temp 36.8 °C (98.3 °F)    Resp 18    Breastfeeding Yes       Patient is status post epidural anesthesia for * No procedures listed *. Nausea/Vomiting: None    Postoperative hydration reviewed and adequate. Pain:  Pain Scale 1: Numeric (0 - 10) (08/04/17 0700)  Pain Intensity 1: 0 (08/04/17 0700)   Managed    Neurological Status:   Neuro (WDL): Within Defined Limits (08/04/17 0700)  Neuro  Neurologic State: Alert (08/04/17 9590)  Orientation Level: Oriented X4 (08/04/17 0415)  Cognition: Appropriate decision making; Appropriate for age attention/concentration; Appropriate safety awareness (08/04/17 0415)  Speech: Clear (08/04/17 0415)  LUE Motor Response: Purposeful (08/04/17 0415)  LLE Motor Response: Pharmacologically paralyzed (08/04/17 0415)  RUE Motor Response: Purposeful (08/04/17 0415)  RLE Motor Response: Pharmacologically paralyzed (08/04/17 0415)   At baseline    Mental Status and Level of Consciousness: Arousable    Pulmonary Status:   O2 Device: Room air (08/04/17 0700)   Adequate oxygenation and airway patent    Complications related to anesthesia: None    Post-anesthesia assessment completed.  No concerns    Signed By: Liza Delcid MD     August 4, 2017

## 2017-08-04 NOTE — PROGRESS NOTES
08/04/17 5935   Pain Assessment   Pain Scale 1 Numeric (0 - 10)   Pain Intensity 1 7   Pain Location 1 Perineum   Pain Orientation 1 Anterior   Pain Description 1 Aching;Burning   Pain Intervention(s) 1 Medication (see MAR)     Patient medicated with Oxycodone 5mg and Motrin 400mg for pain. Encouraged to call if pain medication does not help or if any further needs arise.

## 2017-08-04 NOTE — LACTATION NOTE
In to see mom and infant for first time. Infant was asleep on mom's breast. Mom stated that infant latched and nursed well at first feeding this morning but she has been sleeping since. Mom has attempted to latch infant but she is too sleepy. Informed mom of the expectations of the first 24 hours. Instructed mom to call out when infant awake and cueing and I would observe/assist with the feeding.  Lactation consultant will follow up in am.

## 2017-08-04 NOTE — PROGRESS NOTES
08/04/17 0545   Cervical Exam   Dilation (cm) 10   Eff 100 %   Station 0   Dilation Complete Date 08/04/17   Dilation Time Complete 0545     I/O cath (300 mL clear, yellow urine)    SVE (see above)    Pt repositioned to sitting position with legs butterflied to allow patient to labor down. All needs met and call light within easy reach.

## 2017-08-04 NOTE — ROUTINE PROCESS
SBAR IN Report: Mother    Verbal report received from LEONILA Miller RN (full name & credentials) on this patient, who is now being transferred from  (unit) for routine progression of care. The patient is not wearing a green \"Anesthesia-Duramorph\" band. Report consisted of patient's Situation, Background, Assessment and Recommendations (SBAR). Cranks ID bands were compared with the identification form, and verified with the patient and transferring nurse. Information from the Procedure Summary and the Denisse Report was reviewed with the transferring nurse; opportunity for questions and clarification provided.

## 2017-08-04 NOTE — L&D DELIVERY NOTE
Delivery Summary    Patient: Dawn Mitchell MRN: [id number]  SSN: [id number]    YOB: 1990  Age: 32 y.o. Sex: female       Information for the patient's : Jose Enrique Fisher [[id number]]       Labor Events:    Labor: No   Rupture Date: 2017   Rupture Time: 5:45 AM   Rupture Type SROM   Amniotic Fluid Volume:      Amniotic Fluid Description: Clear None   Induction: None       Augmentation: None   Labor Events: None     Cervical Ripening:     None     Delivery Events:  Episiotomy: Midline   Laceration(s): Vaginal     Repaired: Yes    Number of Repair Packets: 3   Suture Type and Size: Vicryl 3-0     Estimated Blood Loss (ml): 300ml       Delivery Date: 2017    Delivery Time: 6:50 AM  Delivery Type: Vaginal, Spontaneous Delivery  Sex:  Female     Gestational Age: 37w0d   Delivery Clinician:  Avi Wilson  Living Status: Living   Delivery Location: [address] [place] [address]          APGARS  One minute Five minutes Ten minutes   Skin color: 0   1        Heart rate: 2   2        Grimace: 2   2        Muscle tone: 2   2        Breathin   2        Totals: 8   9            Presentation: Vertex    Position: Left Occiput Anterior  Resuscitation Method:  Suctioning-bulb; Tactile Stimulation     Meconium Stained: None      Cord Vessels: 3 Vessels      Cord Events:    Cord Blood Sent?:  Yes    Blood Gases Sent?:  Yes    Placenta:  Date/Time:   6:54 AM  Removal: Spontaneous      Appearance: Normal;Intact     [place] Measurements:  Birth Weight: 7 lb 1.4 oz (3.215 kg)      Birth Length: 49.5 cm      Head Circumference: 33 cm      Chest Circumference: 33 cm     Abdominal Girth:       Other Providers:   Mailni FOURNIER;ANTONINO SOFIA;LEANA MITCHELL;AIDEN MAK;JOAN KUMAR, Obstetrician;Primary Nurse;Charge Nurse;Scrub Tech;Scrub Tech           Group B Strep:   Lab Results   Component Value Date/Time    GrBStrep, External Negative - Urine 2017     Information for the patient's : Harriett Azar [[id number]]   No results found for: ABORH, PCTABR, PCTDIG, BILI, ABORHEXT, ABORH    Lab Results   Component Value Date/Time    APH 7.431 (H) 2017 06:50 AM    APCO2 33 2017 06:50 AM    APO2 30 (H) 2017 06:50 AM    AHCO3 21 (L) 2017 06:50 AM    ABDC 2.1 (H) 2017 06:50 AM    EPHV 7.436 2017 06:50 AM    PCO2V 31 2017 06:50 AM    PO2V 30 (L) 2017 06:50 AM    HCO3V 21 2017 06:50 AM    EBDV 2.5 2017 06:50 AM    SITE CORD 2017 06:50 AM    SITE CORD 2017 06:50 AM    RSCOM N A at . Not read back. 2017 06:50 AM    RSCOM N A at 8 2017 7 06 41 AM. Not read back.  2017 06:50 AM

## 2017-08-04 NOTE — LACTATION NOTE

## 2017-08-04 NOTE — ANESTHESIA PROCEDURE NOTES
Epidural Block    Start time: 8/4/2017 3:38 AM  End time: 8/4/2017 3:43 AM  Performed by: Artemio Arroyo by: Thelma Young     Pre-Procedure  Indication: labor epidural    Preanesthetic Checklist: patient identified, risks and benefits discussed, anesthesia consent, site marked, patient being monitored, timeout performed and anesthesia consent    Timeout Time: 03:38        Epidural:   Patient position:  Seated  Prep region:  Lumbar  Prep: Patient draped and Chlorhexidine    Location:  L2-3    Needle and Epidural Catheter:   Needle Type:  Tuohy  Needle Gauge:  17 G  Injection Technique:  Loss of resistance using air  Attempts:  1  Depth in Epidural Space (cm):  8  Events: no blood with aspiration, no cerebrospinal fluid with aspiration, no paresthesia and negative aspiration test    Test Dose:  Lidocaine 1.5% w/ epi and negative    Assessment:   Catheter Secured:  Tegaderm and tape  Insertion:  Uncomplicated  Patient tolerance:  Patient tolerated the procedure well with no immediate complications

## 2017-08-04 NOTE — PROGRESS NOTES
0430- BP 91/54, EFM reviewed with late decelerations, CRNA notified. 7105- Pt turned onto right lateral side and Ephedrine given by CRNA. Oxygen 15 L/min non re-breather remains on patient.

## 2017-08-04 NOTE — PROGRESS NOTES
Allen Lee at bedside at 0237. CRNA Kalin at bedside at 3139 for epidural placement at bedside. Assisted pt to sitting up on bedside at 0338. Educated patient on proper position for anesthesia.  pre-epidural.     Timeout completed at 3346 with MD, JACQUE and myself at bedside. Test dose given at 0343. Negative reaction. Dose given at 0348. Pt assisted to lying back in right tilt position.  at this time. See anesthesia record for details. See vital sign flow sheet for BP. Tolerated procedure well.

## 2017-08-04 NOTE — PROGRESS NOTES
Dr. Vane Grande called and notified that patient is having moderate bleeding and fundus was slightly boggy firmed with massage  Orders to run another bag of pitocin

## 2017-08-04 NOTE — PROGRESS NOTES
Pt in triage OBED2 with c/o UCs and SROM around 0230 with clear blood tinged fluid. Pt has been nauseous and vomiting x1 in triage room. Pt breathing through contractions. EFM/TOCO tested and applied. Abdomen palpated soft and non-tender. +FM. [id number] Dr. Angel Garcia at bedside, strip reviewed by MD. Andrea Bey 5/100 per MD, see MD note. Pt SROM per MD. MD orders received for admission.

## 2017-08-05 VITALS
SYSTOLIC BLOOD PRESSURE: 112 MMHG | HEART RATE: 77 BPM | OXYGEN SATURATION: 98 % | RESPIRATION RATE: 12 BRPM | TEMPERATURE: 98.1 F | DIASTOLIC BLOOD PRESSURE: 75 MMHG

## 2017-08-05 PROBLEM — R11.0 NAUSEA: Status: RESOLVED | Noted: 2017-07-03 | Resolved: 2017-08-05

## 2017-08-05 PROCEDURE — 74011250637 HC RX REV CODE- 250/637: Performed by: OBSTETRICS & GYNECOLOGY

## 2017-08-05 PROCEDURE — 90715 TDAP VACCINE 7 YRS/> IM: CPT | Performed by: OBSTETRICS & GYNECOLOGY

## 2017-08-05 PROCEDURE — 74011250636 HC RX REV CODE- 250/636: Performed by: OBSTETRICS & GYNECOLOGY

## 2017-08-05 RX ORDER — IBUPROFEN 800 MG/1
800 TABLET ORAL
Status: DISCONTINUED | OUTPATIENT
Start: 2017-08-05 | End: 2017-08-05 | Stop reason: HOSPADM

## 2017-08-05 RX ORDER — IBUPROFEN 800 MG/1
800 TABLET ORAL
Qty: 100 TAB | Refills: 2 | Status: SHIPPED | OUTPATIENT
Start: 2017-08-05 | End: 2017-09-13

## 2017-08-05 RX ORDER — OXYCODONE AND ACETAMINOPHEN 5; 325 MG/1; MG/1
1 TABLET ORAL
Status: DISCONTINUED | OUTPATIENT
Start: 2017-08-05 | End: 2017-08-05 | Stop reason: HOSPADM

## 2017-08-05 RX ORDER — OXYCODONE AND ACETAMINOPHEN 5; 325 MG/1; MG/1
1-2 TABLET ORAL
Qty: 40 TAB | Refills: 0 | Status: SHIPPED | OUTPATIENT
Start: 2017-08-05 | End: 2017-09-13

## 2017-08-05 RX ADMIN — OXYCODONE HYDROCHLORIDE 5 MG: 5 TABLET ORAL at 00:53

## 2017-08-05 RX ADMIN — OXYCODONE HYDROCHLORIDE AND ACETAMINOPHEN 1 TABLET: 5; 325 TABLET ORAL at 05:33

## 2017-08-05 RX ADMIN — TETANUS TOXOID, REDUCED DIPHTHERIA TOXOID AND ACELLULAR PERTUSSIS VACCINE, ADSORBED 0.5 ML: 5; 2.5; 8; 8; 2.5 SUSPENSION INTRAMUSCULAR at 08:59

## 2017-08-05 RX ADMIN — OXYCODONE HYDROCHLORIDE AND ACETAMINOPHEN 1 TABLET: 5; 325 TABLET ORAL at 10:35

## 2017-08-05 RX ADMIN — DOCUSATE SODIUM 100 MG: 100 CAPSULE, LIQUID FILLED ORAL at 08:59

## 2017-08-05 RX ADMIN — IBUPROFEN 800 MG: 800 TABLET ORAL at 05:32

## 2017-08-05 RX ADMIN — IBUPROFEN 400 MG: 400 TABLET, FILM COATED ORAL at 00:53

## 2017-08-05 NOTE — DISCHARGE INSTRUCTIONS
Discharge instruction to follow: Activity: Pelvis rest for 6 weeks     No heavy lifting over 15 lbs for 2 weeks     No driving for 2 weeks     No push/pull motion such as sweeping or vacuuming for 2 weeks     No tub baths for 6 weeks    If using jocelyn-bottle continue to use until comfortable stopping. Change sanitary pad after each urination or bowel movement. Call MD for the following:      Fever over 101 F; pain not relieved by medication; foul smelling vaginal discharge or an increase in vaginal bleeding. Take medication as prescribed. Follow up with MD as order. After Your Delivery (the Postpartum Period): Care Instructions  Your Care Instructions  Congratulations on the birth of your baby. Like pregnancy, the  period can be a time of excitement, vidhi, and exhaustion. You may look at your wondrous little baby and feel happy. You may also be overwhelmed by your new sleep hours and new responsibilities. At first, babies often sleep during the days and are awake at night. They do not have a pattern or routine. They may make sudden gasps, jerk themselves awake, or look like they have crossed eyes. These are all normal, and they may even make you smile. In these first weeks after delivery, try to take good care of yourself. It may take 4 to 6 weeks to feel like yourself again, and possibly longer if you had a  birth. You will likely feel very tired for several weeks. Your days will be full of ups and downs, but lots of vidhi as well. Follow-up care is a key part of your treatment and safety. Be sure to make and go to all appointments, and call your doctor if you are having problems. It's also a good idea to know your test results and keep a list of the medicines you take. How can you care for yourself at home? Take care of your body after delivery  · Use pads instead of tampons for the bloody flow that may last as long as 2 weeks.   · Ease cramps with ibuprofen (Advil, Motrin). · Ease soreness of hemorrhoids and the area between your vagina and rectum with ice compresses or witch hazel pads. · Ease constipation by drinking lots of fluid and eating high-fiber foods. Ask your doctor about over-the-counter stool softeners. · Cleanse yourself with a gentle squeeze of warm water from a bottle instead of wiping with toilet paper. · Take a sitz bath in warm water several times a day. · Wear a good nursing bra. Ease sore and swollen breasts with warm, wet washcloths. · If you are not breastfeeding, use ice rather than heat for breast soreness. · Your period may not start for several months if you are breastfeeding. You may bleed more, and longer at first, than you did before you got pregnant. · Wait until you are healed (about 4 to 6 weeks) before you have sexual intercourse. Your doctor will tell you when it is okay to have sex. · Try not to travel with your baby for 5 or 6 weeks. If you take a long car trip, make frequent stops to walk around and stretch. Avoid exhaustion  · Rest every day. Try to nap when your baby naps. · Ask another adult to be with you for a few days after delivery. · Plan for  if you have other children. · Stay flexible so you can eat at odd hours and sleep when you need to. Both you and your baby are making new schedules. · Plan small trips to get out of the house. Change can make you feel less tired. · Ask for help with housework, cooking, and shopping. Remind yourself that your job is to care for your baby. Know about help for postpartum depression  · \"Baby blues\" are common for the first 1 to 2 weeks after birth. You may cry or feel sad or irritable for no reason. · Rest whenever you can. Being tired makes it harder to handle your emotions. · Go for walks with your baby. · Talk to your partner, friends, and family about your feelings.   · If your symptoms last for more than a few weeks, or if you feel very depressed, ask your doctor for help. · Postpartum depression can be treated. Support groups and counseling can help. Sometimes medicine can also help. Stay healthy  · Eat healthy foods so you have more energy, make good breast milk, and lose extra baby pounds. · If you breastfeed, avoid alcohol and drugs. Stay smoke-free. If you quit during pregnancy, congratulations. · Start daily exercise after 4 to 6 weeks, but rest when you feel tired. · Learn exercises to tone your belly. Do Kegel exercises to regain strength in your pelvic muscles. You can do these exercises while you stand or sit. ¨ Squeeze the same muscles you would use to stop your urine. Your belly and thighs should not move. ¨ Hold the squeeze for 3 seconds, and then relax for 3 seconds. ¨ Start with 3 seconds. Then add 1 second each week until you are able to squeeze for 10 seconds. ¨ Repeat the exercise 10 to 15 times for each session. Do three or more sessions each day. · Find a class for new mothers and new babies that has an exercise time. · If you had a  birth, give yourself a bit more time before you exercise, and be careful. When should you call for help? Call 911 anytime you think you may need emergency care. For example, call if:  · You passed out (lost consciousness). Call your doctor now or seek immediate medical care if:  · You have severe vaginal bleeding. This means you are passing blood clots and soaking through a pad each hour for 2 or more hours. · You are dizzy or lightheaded, or you feel like you may faint. · You have a fever. · You have new belly pain, or your pain gets worse. Watch closely for changes in your health, and be sure to contact your doctor if:  · Your vaginal bleeding seems to be getting heavier. · You have new or worse vaginal discharge. · You feel sad, anxious, or hopeless for more than a few days. · You do not get better as expected. Where can you learn more?   Go to http://danielle-lucia.info/. Enter A461 in the search box to learn more about \"After Your Delivery (the Postpartum Period): Care Instructions. \"  Current as of: March 16, 2017  Content Version: 11.3  © 3745-5390 [employer]. Care instructions adapted under license by [employer] (which disclaims liability or warranty for this information). If you have questions about a medical condition or this instruction, always ask your healthcare professional. [place] [address] any warranty or liability for your use of this information.

## 2017-08-05 NOTE — PROGRESS NOTES
Patient states at last pain reassessment that the medication did not help her pain. Dr. Lorna Judge called with patient complaint. Orders received to change medication to Motrin 800 mg every 8 hrs PRN and Percocet 5 mg every 4 hrs PRN. Previous order for Oxycodone 5 mg and Motrin 400 mg discontinued.

## 2017-08-05 NOTE — PROGRESS NOTES
Progress Note                               Patient: Lily Walls MRN: [id number]  SSN: [id number]    YOB: 1990  Age: 32 y.o. Sex: female      Postpartum Day Number 1    Subjective:     Patient doing well postpartum without significant complaints. Voiding without difficulty. Patient reports normal lochia. . Breastfeeding: Yes     Objective:     Patient Vitals for the past 18 hrs:   Temp Pulse Resp BP   17 0715 98.1 °F (36.7 °C) 77 12 112/75   17 1933 97.7 °F (36.5 °C) 76 18 145/85        Temp (24hrs), Av.9 °F (36.6 °C), Min:97.7 °F (36.5 °C), Max:98.1 °F (36.7 °C)      Physical Exam:    General:   Patient without distress. Abdomen: Soft, fundus firm at level of umbilicus, nontender   Lower Extremities: Negative for swelling, cords, or tenderness. Lab/Data Review:  Blood Type:   Lab Results   Component Value Date/Time    ABO/Rh(D) A POSITIVE 2017 03:10 AM    ABO,Rh A Positive 2016      Infant's Blood Type: Information for the patient's : Steve Yao [[id number]]     Lab Results   Component Value Date/Time    ABO/Rh(D) A POSITIVE 2017 06:50 AM       Assessment and Plan:      Will discharge home today at patient request.     Signed By: Radha Rahman MD     2017

## 2017-08-05 NOTE — PROGRESS NOTES
Assessment complete. Patient requests and receives medication for pain. Motrin 400 mg and oxy 5 mg given po.

## 2017-08-05 NOTE — PROGRESS NOTES
Report received from Kyler Haile RN, and care assumed. Bedside report completed. Pt denies any needs at present.

## 2017-08-05 NOTE — DISCHARGE SUMMARY
Obstetrical Discharge Summary     Name: Kindra Diaz MRN: [id number]  SSN: [id number]    YOB: 1990  Age: 32 y.o. Sex: female      Admit Date: 2017    Discharge Date: 2017     Admitting Physician: Margarito Recio MD     Attending Physician:  Callie Carnes MD     * Admission Diagnoses: labor;Normal labor;Normal labor    * Discharge Diagnoses:   Information for the patient's : Edyta Gibbs [[id number]]   Delivery of a 7 lb 1.4 oz (3.215 kg) female infant via Vaginal, Spontaneous Delivery on 2017 at 6:50 AM  by . Apgars were 8 and 9.        Additional Diagnoses:   Hospital Problems as of 2017  Date Reviewed: 2017          Codes Class Noted - Resolved POA    * (Principal)Normal labor ICD-10-CM: O80, Z37.9  ICD-9-CM: 712  2017 - Present Yes             Lab Results   Component Value Date/Time    ABO/Rh(D) A POSITIVE 2017 03:10 AM    Rubella, External immune 2016    GrBStrep, External Negative - Urine 2017    ABO,Rh A Positive 2016      Immunization History   Administered Date(s) Administered    Tdap 2017       * Procedures:  with midline episiotomy and repair  * No surgery found *      [place]  Depression Scale  I have been able to laugh and see the funny side of things: As much as I always could  I have looked forward with enjoyment to things: As much as I ever did  I have blamed myself unnecessarily when things went wrong: No, never  I have been anxious or worried for no good reason: No, not at all  I have felt scared or panicky for no very good reason: No, not at all  Things have been getting on top of me: No, I have been coping as well as ever  I have been so unhappy that I have had difficulty sleeping: No, not at all  I have felt sad or miserable: No, not at all  I have been so unhappy that I have been crying: No, never  The thought of harming myself has occurred to me: Never  Total Score: 0    * Discharge Condition: good    [facility] Course: Normal hospital course following the delivery. * Disposition: Home    Discharge Medications:   Current Discharge Medication List      START taking these medications    Details   ibuprofen (MOTRIN) 800 mg tablet Take 1 Tab by mouth every eight (8) hours as needed. Qty: 100 Tab, Refills: 2      oxyCODONE-acetaminophen (PERCOCET) 5-325 mg per tablet Take 1-2 Tabs by mouth every six (6) hours as needed. Qty: 40 Tab, Refills: 0         CONTINUE these medications which have NOT CHANGED    Details   raNITIdine (ZANTAC) 150 mg tablet Take 1 Tab by mouth two (2) times a day. Qty: 60 Tab, Refills: 12      calcium carbonate (TUMS) 200 mg calcium (500 mg) chew Take 1 Tab by mouth as needed. Indications: HEARTBURN      loratadine (CLARITIN) 10 mg tablet Take 10 mg by mouth. PNV#16-Iron Fum & [id number]-3 35-1-200 mg cap Take  by mouth. * Follow-up Care/Patient Instructions:   Activity: No sex for 6 weeks and No driving while on analgesics  Diet: Regular Diet  Wound Care: As directed    Follow-up Information     Follow up With Details Comments Contact Info    None   None ([phone]) Patient stated that they have no PCP             Signed By:  Desiree Wisdom MD     August 5, 2017

## 2017-08-05 NOTE — LACTATION NOTE
Mom and baby are going home today. Continue to offer the breast without restriction. Mom's milk should be fully in over the next few days. Reviewed engorgement precautions. Hand Expression has been demoed and written hand-out reviewed. As milk comes in baby will be more alert at the breast and swallows will be more obvious. Breasts may feel softer once baby has finished nursing. Baby should be back to birth weight by 3weeks of age. And then gain on average 1 oz per day for the next 2-3 months. Reviewed babies should be exclusively breastfeeding for the first 6 months and that breastfeeding should continue after introduction of appropriate complimentary foods after 6 months. Initial output should be at least 1 wet and 1 bowel movement for each day old baby is. By day 5-7 once milk is fully in baby will consistently have 6 or more soaking wet diapers and about 4 bowel movement. Some babies have a bowel movement with every feeding and some have 1-3 large bowel movements each day. Inadequate output may indicate inadequate feedings and should be reported to your Pediatrician. Bowel habits may change as baby gets older. Encouraged follow-up at Pediatrician in 1-2 days, no later than 1 week of age. Call [facility] for any questions as needed or to set up an OP visit. OP phone calls are returned within 24 hours. Breastfeeding Support Group is offered here monthly. Community Breastfeeding Resource List given.

## 2017-08-05 NOTE — LACTATION NOTE
In to see mom and infant prior to discharge to home. She has been formula feeding infant using curve tip syringe. Mom stated that she has not been offering breast because it is uncomfortable and she did not want to start pumping until she goes home. She has a personal pump at home that she plans to use. Reviewed with her the basics of how to use the pump. Also discussed whether to continue to use curve tip syringe of to change to bottle nipple. Informed mom that if she does not plan to breast feed at all and just provide her expressed breastmilk to go a head and use the nipple. Reviewed discharge information and answered her questions. Mom and infant are following up with [facility] and will see lactation consultant there.

## 2017-09-13 PROBLEM — Z37.9 NORMAL LABOR: Status: RESOLVED | Noted: 2017-07-28 | Resolved: 2017-09-13

## 2018-12-06 PROBLEM — Z34.80 PRENATAL CARE OF MULTIGRAVIDA, ANTEPARTUM: Status: ACTIVE | Noted: 2018-12-06
